# Patient Record
Sex: MALE | Race: WHITE | NOT HISPANIC OR LATINO | Employment: FULL TIME | URBAN - METROPOLITAN AREA
[De-identification: names, ages, dates, MRNs, and addresses within clinical notes are randomized per-mention and may not be internally consistent; named-entity substitution may affect disease eponyms.]

---

## 2018-04-18 ENCOUNTER — CONSULT (OUTPATIENT)
Dept: CARDIOLOGY | Facility: CLINIC | Age: 38
End: 2018-04-18

## 2018-04-18 VITALS
HEIGHT: 71 IN | BODY MASS INDEX: 26.32 KG/M2 | HEART RATE: 84 BPM | SYSTOLIC BLOOD PRESSURE: 132 MMHG | DIASTOLIC BLOOD PRESSURE: 94 MMHG | WEIGHT: 188 LBS

## 2018-04-18 DIAGNOSIS — E78.2 MIXED HYPERLIPIDEMIA: ICD-10-CM

## 2018-04-18 DIAGNOSIS — R03.0 TRANSIENT ELEVATED BLOOD PRESSURE: Primary | ICD-10-CM

## 2018-04-18 PROCEDURE — 93000 ELECTROCARDIOGRAM COMPLETE: CPT | Performed by: INTERNAL MEDICINE

## 2018-04-18 PROCEDURE — 99203 OFFICE O/P NEW LOW 30 MIN: CPT | Performed by: INTERNAL MEDICINE

## 2018-07-20 NOTE — PROGRESS NOTES
Subjective:     Encounter Date:07/23/2018    Patient ID: Julian Willoughby is a 37 y.o. 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     PHYSICIAN: Maribel Syed MD  ALLERGIST: Lala Jeong MD    Chief Complaint:   Chief Complaint   Patient presents with   • Hyperlipidemia   • Hypertension     Problem List:  1. Elevated labile blood pressure readings without definitive diagnosis of hypertension with residual class I symptoms and normal EKG  2. Hyperlipidemia; ASCVD 10-year risk is 1%, 0.6% if treated  3. Allergic rhinitis; gets routine allergy shots  4. Borderline overweight with recent 10-15 pound weight loss with diet (winter 2018)  5. Surgical history:  A. Right tear duct repair  B. [place] teeth extraction     Allergies   Allergen Reactions   • Claritin [Loratadine] Other (See Comments)     Depressed     • Sudafed [Pseudoephedrine Hcl] Other (See Comments)     depressed   • Sulfa Antibiotics GI Intolerance         Current Outpatient Prescriptions:   •  Multiple Vitamin (MULTI-VITAMIN DAILY PO), Take  by mouth As Needed., Disp: , Rfl:     HISTORY OF PRESENT ILLNESS:  Patient is here for a 4 month follow-up.  At his last appointment, he was encouraged to check his blood pressure at home. He denies any chest pain, palpitations, presyncope, syncope, edema, or shortness of breath.  He has tried to watch his diet, eat more fish and lower his carbs.  He has started running and is planning to [facility] the [employer].  He brought his blood pressure log with him today for us to review and average blood pressure is around 125-130 systolic.  Patient otherwise denies chest pain, shortness of breath, PND, edema, palpitations, syncope or presyncope at this time.    Review of Systems   All other systems reviewed and are negative.     Obtained and otherwise negative except as outlined in problem list and  "HPI.    Procedures       Objective:       Vitals:    07/23/18 0838 07/23/18 0840   BP: 121/91 116/87   BP Location: Left arm Left arm   Patient Position: Sitting Standing   Pulse: 77 78   Weight: 89.4 kg (197 lb) 89.4 kg (197 lb)   Height: 177.8 cm (70\") 177.8 cm (70\")     Body mass index is 28.27 kg/m².  Last weight April 2018 was 188 pounds    Physical Exam   Constitutional: He is oriented to person, place, and time. He appears well-developed and well-nourished.   Neck: No JVD present. Carotid bruit: right. No thyromegaly present.   Cardiovascular: Regular rhythm, S1 normal and S2 normal.  Exam reveals no gallop, no S3 and no friction rub.    Murmur heard.   Medium-pitched harsh early systolic murmur is present with a grade of 1/6  at the upper right sternal border radiating to the neck  Pulses:       Dorsalis pedis pulses are 2+ on the right side, and 2+ on the left side.        Posterior tibial pulses are 2+ on the right side, and 2+ on the left side.   Pulmonary/Chest: Effort normal and breath sounds normal. He has no wheezes. He has no rhonchi. He has no rales.   Abdominal: Soft. He exhibits no mass. There is no hepatosplenomegaly. There is no tenderness. There is no guarding.   Bowel sounds audible x4   Musculoskeletal: Normal range of motion. He exhibits no edema.   Lymphadenopathy:     He has no cervical adenopathy.   Neurological: He is alert and oriented to person, place, and time.   Skin: Skin is warm, dry and intact. No rash noted.   Vitals reviewed.        Lab Review:   9/13/17:  · CBC: WBC 5.4, RBC 5.95, hemoglobin 17.5, hematocrit 52.2, MCV 87.7, MCH 29.4, MCHC 33.5, RDW 12.8, platelets 254, MPV 10.6, neutrophils 61.3, lymphocytes 26.7, monocytes 9.8, eos 1.1, basos 0.9  · CMP: Sodium 142, potassium 4.2, chloride 106, CO2 27, glucose 82, BUN 17, creatinine 0.9, calcium 8.7, protein 7.6, albumin 4.1, bilirubin 0.9, alkaline phosphatase 80, AST 25, ALTs 50, globulin 3.5, GFR 95  · Lipid panel: " Cholesterol 176, triglycerides 100, HDL 44,   · Hemoglobin A1c - 5.1%        Assessment:     Overall continued acceptable course with no interim cardiopulmonary complaints with good functional status. We will defer additional diagnostic or therapeutic intervention from a cardiac perspective at this time. Encouraged the patient to monitor his blood pressure twice weekly and to continue the heart healthy diet as well as physical activity.      Diagnosis Plan   1. Transient elevated blood pressure  Stable   2. Mixed hyperlipidemia  No new labs          Plan:         1. Patient to continue current medications and close follow up with the above providers.  2. Tentative cardiology follow up in December 2018 or patient may return sooner PRN.    Scribed for Per Foster MD by Nilsa Martin, MICHAEL 7/23/2018  8:57 AM    I, Per Foster MD, FACC, personally performed the services described in this documentation as scribed by the above named individual in my presence, and it is both accurate and complete. At 9:22 AM on 07/23/2018

## 2018-07-23 ENCOUNTER — OFFICE VISIT (OUTPATIENT)
Dept: CARDIOLOGY | Facility: CLINIC | Age: 38
End: 2018-07-23

## 2018-07-23 VITALS
BODY MASS INDEX: 28.2 KG/M2 | HEIGHT: 70 IN | SYSTOLIC BLOOD PRESSURE: 116 MMHG | WEIGHT: 197 LBS | DIASTOLIC BLOOD PRESSURE: 87 MMHG | HEART RATE: 78 BPM

## 2018-07-23 DIAGNOSIS — R03.0 TRANSIENT ELEVATED BLOOD PRESSURE: Primary | ICD-10-CM

## 2018-07-23 DIAGNOSIS — E78.2 MIXED HYPERLIPIDEMIA: ICD-10-CM

## 2018-07-23 PROCEDURE — 99213 OFFICE O/P EST LOW 20 MIN: CPT | Performed by: INTERNAL MEDICINE

## 2018-12-28 ENCOUNTER — OFFICE VISIT (OUTPATIENT)
Dept: CARDIOLOGY | Facility: CLINIC | Age: 38
End: 2018-12-28

## 2018-12-28 VITALS
HEART RATE: 100 BPM | DIASTOLIC BLOOD PRESSURE: 84 MMHG | BODY MASS INDEX: 27.58 KG/M2 | WEIGHT: 197 LBS | SYSTOLIC BLOOD PRESSURE: 120 MMHG | HEIGHT: 71 IN

## 2018-12-28 DIAGNOSIS — R09.89 LABILE HYPERTENSION: ICD-10-CM

## 2018-12-28 DIAGNOSIS — E78.5 DYSLIPIDEMIA: Primary | ICD-10-CM

## 2018-12-28 PROCEDURE — 99213 OFFICE O/P EST LOW 20 MIN: CPT | Performed by: INTERNAL MEDICINE

## 2018-12-28 NOTE — PROGRESS NOTES
Subjective:     Encounter Date:12/28/2018    Patient ID: Julian Willoughby is a 38 y.o. 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     PHYSICIAN: Maribel Syed MD  ALLERGIST: Lala Jeong MD    Chief Complaint:   Chief Complaint   Patient presents with   • transient elevated blood pressure     Problem List:  1. Elevated labile blood pressure readings without definitive diagnosis of hypertension with residual class I symptoms and normal EKG  2. Hyperlipidemia; ASCVD 10-year risk is 1%, 0.6% if treated  3. Allergic rhinitis; gets routine allergy shots  4. Borderline overweight with recent 10-15 pound weight loss with diet (winter 2018)  5. Surgical history:  a. Right tear duct repair  b. [place] teeth extraction    Allergies   Allergen Reactions   • Claritin [Loratadine] Other (See Comments)     Depressed     • Sudafed [Pseudoephedrine Hcl] Other (See Comments)     depressed   • Sulfa Antibiotics GI Intolerance         Current Outpatient Medications:   •  Multiple Vitamin (MULTI-VITAMIN DAILY PO), Take  by mouth As Needed., Disp: , Rfl:     HISTORY OF PRESENT ILLNESS: Patient returns for scheduled 5-month followup.  He says that his job has changed some now at ; he is still in research, but his responsibilities are new, negotiating contracts for the [facility].  He has not been very active lately; he states that his weight had gotten down to about 183 pounds before he ran his first 5K in August 2018, but due to inactivity, his weight is now back up to 197 pounds (exactly where he was 5 months ago).  He had labs drawn at work on 12/19/2018, and these results are reviewed with him (see below).  He is advised that LDL above 130 would be of concern, but his is acceptable on these labs.  He is concerned about his slightly elevated  "blood pressure, and he is advised that weight loss would help in this regard.  He has plans to meet up with a group at [employer] to run with them.  He has had his flu shot and a hepatitis A vaccine this year. Patient otherwise denies chest pain, shortness of breath, PND, edema, palpitations, syncope or presyncope at this time.      ROS   Obtained and otherwise negative except as outlined in problem list and HPI.    Procedures       Objective:       Vitals:    12/28/18 1026 12/28/18 1027 12/28/18 1047   BP: 138/98 131/89 120/84   BP Location: Left arm Left arm Left arm   Patient Position: Sitting Standing Sitting   Cuff Size:   Adult   Pulse: 95 100    Weight: 89.4 kg (197 lb) 89.4 kg (197 lb)    Height: 180.3 cm (71\") 180.3 cm (71\")      Body mass index is 27.48 kg/m².   Last weight:  197 lbs.    Physical Exam   Constitutional: He is oriented to person, place, and time. He appears well-developed and well-nourished.   Neck: No JVD present. Carotid bruit is not present. No thyromegaly present.   Cardiovascular: Regular rhythm, S1 normal, S2 normal and normal heart sounds. Exam reveals no gallop, no S3 and no friction rub.   No murmur heard.  Pulses:       Dorsalis pedis pulses are 2+ on the right side, and 2+ on the left side.        Posterior tibial pulses are 2+ on the right side, and 2+ on the left side.   Pulmonary/Chest: Effort normal and breath sounds normal. He has no wheezes. He has no rhonchi. He has no rales.   Abdominal: Soft. He exhibits no mass. There is no hepatosplenomegaly. There is no tenderness. There is no guarding.   Bowel sounds audible x4   Musculoskeletal: Normal range of motion. He exhibits no edema.   Lymphadenopathy:     He has no cervical adenopathy.   Neurological: He is alert and oriented to person, place, and time.   Skin: Skin is warm, dry and intact. No rash noted.   Vitals reviewed.        Lab Review:  12/19/2018: (reviewed with patient in office today)  · FLP - total " cholesterol 173, triglycerides 102, HDL-C 50, LDL-C 102, non-HDL-C 123  · Glucose - 83            Assessment:   Overall continued acceptable course with no interim cardiopulmonary complaints with good functional status. We will defer additional diagnostic or therapeutic intervention from a cardiac perspective at this time.       Diagnosis Plan   1. Dyslipidemia  Continue current activity and diet; no current requirement for statin drug therapy   2. Labile hypertension  Continue activity schedule and monitor blood pressure          Plan:         1. Patient to continue current medications and close follow up with the above providers.  2. Tentative cardiology follow up in July 2019, or patient may return sooner PRN.    Transcribed by Felicia Caraballo for Dr. Per Foster at 10:40 AM on 12/28/2018    I, Per Foster MD, [facility], personally performed the services described in this documentation as scribed by the above named individual in my presence, and it is both accurate and complete. At 11:15 AM on 12/28/2018

## 2020-10-16 ENCOUNTER — OFFICE VISIT (OUTPATIENT)
Dept: CARDIOLOGY | Facility: CLINIC | Age: 40
End: 2020-10-16

## 2020-10-16 VITALS
TEMPERATURE: 96 F | DIASTOLIC BLOOD PRESSURE: 90 MMHG | WEIGHT: 193 LBS | HEART RATE: 101 BPM | HEIGHT: 71 IN | BODY MASS INDEX: 27.02 KG/M2 | SYSTOLIC BLOOD PRESSURE: 140 MMHG

## 2020-10-16 DIAGNOSIS — E78.5 DYSLIPIDEMIA: Primary | ICD-10-CM

## 2020-10-16 DIAGNOSIS — R09.89 LABILE HYPERTENSION: ICD-10-CM

## 2020-10-16 DIAGNOSIS — R03.0 TRANSIENT ELEVATED BLOOD PRESSURE: ICD-10-CM

## 2020-10-16 PROCEDURE — 99214 OFFICE O/P EST MOD 30 MIN: CPT | Performed by: INTERNAL MEDICINE

## 2020-10-16 NOTE — PROGRESS NOTES
Subjective:     Encounter Date:10/16/2020    Patient ID: Julian Willoughby is a 40 y.o. 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     REMOTE PHYSICIAN: Maribel Syed MD  ALLERGIST: Lala Jeong MD    Chief Complaint:   Chief Complaint   Patient presents with   • Follow-up     Labile Hypertension       Problem List:  1. Elevated labile blood pressure readings without definitive diagnosis of hypertension with residual class I symptoms and normal EKG  2. Hyperlipidemia; ASCVD 10-year risk is 1%, 0.6% if treated  3. Allergic rhinitis; gets routine allergy shots  4. Borderline overweight with recent 10-15 pound weight loss with diet (winter 2018)  5. Surgical history:  a. Right tear duct repair  b. [place] teeth extraction    Allergies   Allergen Reactions   • Claritin [Loratadine] Other (See Comments)     Depressed     • Sudafed [Pseudoephedrine Hcl] Other (See Comments)     depressed   • Sulfa Antibiotics GI Intolerance       Current Outpatient Medications:   •  Multiple Vitamin (MULTI-VITAMIN DAILY PO), Take  by mouth As Needed., Disp: , Rfl:     History of Present Illness: Patient returns for a follow up after a 22 month hiatus. Patient has been doing well overall. He reports taking blood pressure readings at home with readings that fluctuated between 130-150 torr systolic. In the last week he has had a reading up to 157 systolic. He says that his diet has changed recently to a more processed food diet due to issues getting the food that he wants during the pandemic. He is staying active by participating in cross-fit training programs and mentions a history of running. He has eliminated red meat from his diet. He has struggles sleeping at times but has tried using ASMR techniques to help him sleep. He is  "still working for [employer].  Patient otherwise denies chest pain, shortness of breath, PND, edema, palpitations, syncope or presyncope at this time. Patient has not had any ER visits, hospitalizations, surgeries, or new diagnoses since he was last seen. He has not had any laboratory studies since he was last seen in the office. He has had influenza immunization this fall.       ROS   Obtained and negative except as outlined in problem list and HPI.    Procedures       Objective:       Vitals:    10/16/20 1532 10/16/20 1534   BP: 134/100 140/90   BP Location: Right arm Right arm   Patient Position: Sitting Standing   Pulse: 97 101   Temp: 96 °F (35.6 °C)    Weight: 87.5 kg (193 lb)    Height: 180.3 cm (71\")      Body mass index is 26.92 kg/m².  Last weight: 197 lbs.    Vitals signs reviewed.   Constitutional:       Appearance: Well-developed.   Neck:      Thyroid: No thyromegaly.      Vascular: No carotid bruit or JVD.      Lymphadenopathy: No cervical adenopathy.   Pulmonary:      Effort: Pulmonary effort is normal.      Breath sounds: Normal breath sounds. No wheezing. No rhonchi. No rales.   Cardiovascular:      Regular rhythm.      No gallop. No S3 gallop.      Comments: No radial-femoral pulse delay  No flank bruits or pulsations noted  Pulses:     Dorsalis pedis: 2+ bilaterally.     Posterior tibial: 2+ bilaterally.  Edema:     Peripheral edema absent.   Abdominal:      Palpations: Abdomen is soft. There is no abdominal mass.      Tenderness: There is no abdominal tenderness. There is no guarding.   Musculoskeletal: Normal range of motion.   Skin:     General: Skin is warm and dry.      Findings: No rash.   Neurological:      Mental Status: Alert and oriented to person, place, and time.           Lab Review: No recent lab results available for review.              Assessment:       Overall continued acceptable course with no new interim cardiopulmonary complaints with good functional status. We will " defer additional diagnostic or therapeutic intervention from a cardiac perspective at this time other than to screen for persistent hypertension and sleep apnea.     Diagnosis Plan   1. Dyslipidemia  Continue current activity and diet; will reassess FLP   2. Labile hypertension  Continue activity and monitor blood pressure; see below          Plan:         1. Patient to continue current medications and close follow up with the above providers.  2. 24 hour ambulatory blood pressure monitor.  3. Outpatient nocturnal oximetry screening study  4. The following laboratory studies are requested:   A. CMP  B. FLP  C. CBC  D. TSH  5. 1 800 card  6. Tentative cardiology follow up in 6-8 weeks or patient may return sooner PRN.  7. Consider urinalysis if diminished GFR.      Scribed for Per Foster MD by Benjamin Mcfadden 10/16/2020  15:58 EDT    I, Per Foster MD, [facility], personally performed the services described in this documentation as scribed by the above named individual in my presence, and it is both accurate and complete. At 16:15 EDT on 10/16/2020

## 2020-11-03 ENCOUNTER — HOSPITAL ENCOUNTER (OUTPATIENT)
Dept: CARDIOLOGY | Facility: HOSPITAL | Age: 40
Discharge: HOME | End: 2020-11-03

## 2020-11-03 ENCOUNTER — CLINICAL SUPPORT (OUTPATIENT)
Dept: CARDIOLOGY | Facility: HOSPITAL | Age: 40
End: 2020-11-03

## 2020-11-03 DIAGNOSIS — R03.0 TRANSIENT ELEVATED BLOOD PRESSURE: ICD-10-CM

## 2020-11-03 PROCEDURE — 93786 AMBL BP MNTR W/SW REC ONLY: CPT

## 2020-11-03 NOTE — PROGRESS NOTES
Julian Willoughby  : 1980  MRN: [id number]  Today's Date: 20    Bedtime __________    I woke up at _______      [facility]  [address] [place] [address], [address] [address]  [phone]    During the day, the monitor will pump air and the cuff will inflate approximately every twenty minutes.  In the evening, the pump-up interval changes to every thirty minutes.  In the late evening (9:00 p.m.--10:00 p.m.), the cuff will inflate every hour until 8:00 a.m. the following morning when the twenty-minute interval begins again.    When you feel the cuff inflating, stop what you are doing, straighten your arm, and be still.  If while the cuff is inflated, your arm is bent or there is too much movement, the cuff will re-inflate and attempt another reading.  When the cuff deflates, resume your normal activity.    The monitor is self-contained and records and displays all readings.  Every time the cuff deflates, your blood pressure and heart rate will be displayed twice.    If you bathe or change clothing, remove the monitor.  It is difficult to re-wrap or re-apply the cuff; before removing it, make sure someone is available to  help you.  Whether the cuff is on your left or right arm, the gray tube must be directly above the bend of your elbow.    If the cuff inflates when it is not wrapped around your arm, let it deflate and disconnect it from the black tube, push out any remaining air, reconnect the tubing, and wrap it around your arm again.  The monitor will resume readings at the next proper time.    After wearing the cuff for twenty-four hours, turn the monitor off by holding the button for several seconds, or by removing the batteries.            BPMONITORINSTRUCTIONS 8                  Ambulatory Blood Pressure Monitor Patient Agreement    I, Julian Willoughby, 1980, [id number] assume full responsibility for the safekeeping and care of the monitor while it  is in my possession.      I have been advised that it is not waterproof and that any water that comes in contact with the monitor may cause damage that could require the unit to be replaced.    Until I return the monitor and its attachments to [facility], I will assume responsibility for any damage to the monitor due to neglect or misuse of same.    I understand that I am responsible for returning the monitor or I will be responsible for all costs for replacing the equipment.  Failure to return the monitor will result in a replacement charge of $1800.00 which will be billed to me five business days following the date of hookup.    Unless instructed otherwise, I will wear the monitor for 24 hours.    I was given the opportunity to ask questions and left the office with the device instructions.    I will return the monitor no later than 11/04/2020 to:    [facility], [address] [place] [address], [address], [place] [address]    Date of Hookup: 11/03/20    Ordered by: Dr. Foster    Hooked up by: Stefania Alston MA Cuff placed on left arm.    Serial Number: [id number]    Termination Date: 11/04/2020  Time: 8:40am    Patient or Guardian Signature: ______________________, 11/03/20    Date Monitor Returned: __________________            BPMONITORINSTRUCTIONS 8.12.2019

## 2020-11-06 ENCOUNTER — TELEPHONE (OUTPATIENT)
Dept: CARDIOLOGY | Facility: CLINIC | Age: 40
End: 2020-11-06

## 2020-11-06 DIAGNOSIS — R09.89 LABILE HYPERTENSION: Primary | ICD-10-CM

## 2020-11-06 NOTE — TELEPHONE ENCOUNTER
Called pt regarded elevated BP readings on 24-hour blood pressure monitor.     Per [facility]- plasma catecholamines, PRA, amlodipine-valsartan 5-160 mg daily.    Pt states that he has concerns about starting medications without knowing cause of HTN and would prefer not to start medications at this time and is going to have labs done first.     Advised pt that labs orders are in and that he can go to [facility] lab to have labs drawn and to continue to monitor BP at home. Pt verbalizes understanding and agreeable to plan.      [facility] notified.

## 2020-11-07 ENCOUNTER — LAB (OUTPATIENT)
Dept: LAB | Facility: HOSPITAL | Age: 40
End: 2020-11-07

## 2020-11-07 DIAGNOSIS — R09.89 LABILE HYPERTENSION: ICD-10-CM

## 2020-11-07 PROCEDURE — 82384 ASSAY THREE CATECHOLAMINES: CPT

## 2020-11-07 PROCEDURE — 36415 COLL VENOUS BLD VENIPUNCTURE: CPT

## 2020-11-07 PROCEDURE — 84244 ASSAY OF RENIN: CPT

## 2020-11-07 PROCEDURE — 82088 ASSAY OF ALDOSTERONE: CPT

## 2020-11-13 LAB
DOPAMINE SERPL-MCNC: <30 PG/ML (ref 0–48)
EPINEPH PLAS-MCNC: 100 PG/ML (ref 0–62)
NOREPINEPH PLAS-MCNC: 465 PG/ML (ref 0–874)

## 2020-11-16 ENCOUNTER — TELEPHONE (OUTPATIENT)
Dept: CARDIOLOGY | Facility: CLINIC | Age: 40
End: 2020-11-16

## 2020-11-25 ENCOUNTER — OFFICE VISIT (OUTPATIENT)
Dept: CARDIOLOGY | Facility: CLINIC | Age: 40
End: 2020-11-25

## 2020-11-25 VITALS
HEIGHT: 71 IN | SYSTOLIC BLOOD PRESSURE: 130 MMHG | WEIGHT: 195 LBS | BODY MASS INDEX: 27.3 KG/M2 | DIASTOLIC BLOOD PRESSURE: 93 MMHG | HEART RATE: 81 BPM

## 2020-11-25 DIAGNOSIS — R09.89 LABILE HYPERTENSION: Primary | ICD-10-CM

## 2020-11-25 DIAGNOSIS — E78.5 DYSLIPIDEMIA: ICD-10-CM

## 2020-11-25 PROCEDURE — 99213 OFFICE O/P EST LOW 20 MIN: CPT | Performed by: INTERNAL MEDICINE

## 2020-11-25 NOTE — PROGRESS NOTES
Subjective:     Encounter Date:11/25/2020    Patient ID: Julian Willoughby is a 40 y.o. 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     REMOTE PHYSICIAN: Maribel Syed MD  ALLERGIST: Lala Jeong MD    Chief Complaint:   Chief Complaint   Patient presents with   • Dyslipidemia     f/u       Problem List:  1. Elevated labile blood pressure readings without definitive diagnosis of hypertension with residual class I symptoms and normal EKG, October 2020 with abnormal 24-hour ambulatory blood pressure monitor and acceptable serum catecholamine results as well as aldosterone/ PRA ratio result, autumn 2020  2. Hyperlipidemia; ASCVD 10-year risk is 1%, 0.6% if treated  3. Allergic rhinitis; gets routine allergy shots  4. Borderline overweight with recent 10-15 pound weight loss with diet (winter 2018)  5. Surgical history:  a. Right tear duct repair  b. [place] teeth extraction    Allergies   Allergen Reactions   • Claritin [Loratadine] Other (See Comments)     Depressed     • Sudafed [Pseudoephedrine Hcl] Other (See Comments)     depressed   • Sulfa Antibiotics GI Intolerance       Current Outpatient Medications:   •  Multiple Vitamin (MULTI-VITAMIN DAILY PO), Take  by mouth As Needed., Disp: , Rfl:     History of Present Illness: Patient returns for scheduled 6-week follow up. Patient had an abnormal 24-hour blood pressure monitor obtained and was recommended to initiate Exforge 5/160 daily. He also had laboratory studies obtained (see below).  The patient did not begin Exforge due to concerns of side effects and hesitancy to take medication.  He reports he has altered his diet and started to avoid eating significantly processed foods.  He continues to exercise regularly with [employer].  He has not been checking  "his blood pressure regularly nor has he seen any other providers in the interim.  He denies any current cardiac complaints. Patient otherwise denies chest pain, shortness of breath, PND, edema, palpitations, syncope or presyncope at this time.       ROS   Obtained and negative except as outlined in problem list and HPI.    Procedures       Objective:       Vitals:    11/25/20 0922 11/25/20 0925   BP: 136/91 130/93   BP Location: Right arm Right arm   Patient Position: Sitting Standing   Pulse: 77 81   Weight: 88.5 kg (195 lb)    Height: 180.3 cm (71\")      Body mass index is 27.2 kg/m².  Last weight: 197 lbs    Vitals signs reviewed.   Constitutional:       Appearance: Well-developed.   Neck:      Thyroid: No thyromegaly.      Vascular: No carotid bruit or JVD.      Lymphadenopathy: No cervical adenopathy.   Pulmonary:      Effort: Pulmonary effort is normal.      Breath sounds: Normal breath sounds. No wheezing. No rhonchi. No rales.   Cardiovascular:      Regular rhythm.      No gallop. No S3 gallop.   Pulses:     Radial: 2+ bilaterally.     Posterior tibial: 2+ bilaterally.  Edema:     Peripheral edema absent.   Abdominal:      Palpations: Abdomen is soft. There is no abdominal mass.      Tenderness: There is no abdominal tenderness. There is no guarding.   Musculoskeletal: Normal range of motion.   Skin:     General: Skin is warm and dry.      Findings: No rash.   Neurological:      Mental Status: Alert and oriented to person, place, and time.           Lab Review:     Reviewed with patient by letter:  Lab Results   Component Value Date    ALDOSTE 9.2 11/07/2020     · PRA- within normal limits  · Norepinephrine normal   · Dopamine- normal  · Epinephrine- 100    24-hour blood pressure monitor, 11/05/2020 (reviewed with patient by letter):  Average blood pressure of 140/96. Systolic blood pressure was greater than 140 76% of the time with a maximum value of 183. Awake blood pressure was 149/99 and sleep blood " pressure was 134/83. Diastolic was 90 torr or greater 76% of the time. HR on average was 85 BPM.   RECOMMENDED: plasma catecholamine level as well as serum aldosterone/PRA ratio and initiation of Exforge 5/160 daily.       Assessment:       Overall continued acceptable course with no new interim cardiopulmonary complaints with good functional status. We will defer additional diagnostic or therapeutic intervention from a cardiac perspective at this time other than for him to obtain a urinalysis and recommended laboratory studies from his last visit and to monitor his blood pressure.      Diagnosis Plan   1. Labile hypertension  Acceptable today in office; Continue dietary and lifestyle modifications.  Patient to keep blood pressure log and notify us if remaining elevated. Urinalysis, CMP, CBC, lipid panel, and TSH to be completed.   2. Dyslipidemia  No new FLP available for review; continue heart healthy diet and activity.  Patient agreeable to obtaining labs as discussed at prior visit.          Plan:         1. Patient to continue current medications and close follow up with the above providers.  2. The following laboratory studies are ordered:  A. Urinalysis  B. CMP  C. FLP  D. CBC  E. TSH  3. Tentative cardiology follow up in January 2021 or patient may return sooner PRN.    Scribed for Per Foster MD by MICHAEL Pina 11/25/2020  10:45 EST    I, Per Foster MD, [facility], personally performed the services described in this documentation as scribed by the above named individual in my presence, and it is both accurate and complete. At 10:47 EST on 11/25/2020

## 2020-11-27 ENCOUNTER — LAB (OUTPATIENT)
Dept: LAB | Facility: HOSPITAL | Age: 40
End: 2020-11-27

## 2020-11-27 DIAGNOSIS — R09.89 LABILE HYPERTENSION: ICD-10-CM

## 2020-11-27 DIAGNOSIS — E78.5 DYSLIPIDEMIA: ICD-10-CM

## 2020-11-27 LAB
ALBUMIN SERPL-MCNC: 4.8 G/DL (ref 3.5–5.2)
ALBUMIN/GLOB SERPL: 1.5 G/DL
ALP SERPL-CCNC: 77 U/L (ref 39–117)
ALT SERPL W P-5'-P-CCNC: 49 U/L (ref 1–41)
ANION GAP SERPL CALCULATED.3IONS-SCNC: 10.1 MMOL/L (ref 5–15)
AST SERPL-CCNC: 42 U/L (ref 1–40)
BILIRUB SERPL-MCNC: 0.9 MG/DL (ref 0–1.2)
BILIRUB UR QL STRIP: NEGATIVE
BUN SERPL-MCNC: 16 MG/DL (ref 6–20)
BUN/CREAT SERPL: 17.4 (ref 7–25)
CALCIUM SPEC-SCNC: 9.7 MG/DL (ref 8.6–10.5)
CHLORIDE SERPL-SCNC: 102 MMOL/L (ref 98–107)
CHOLEST SERPL-MCNC: 183 MG/DL (ref 0–200)
CLARITY UR: CLEAR
CO2 SERPL-SCNC: 27.9 MMOL/L (ref 22–29)
COLOR UR: ABNORMAL
CREAT SERPL-MCNC: 0.92 MG/DL (ref 0.76–1.27)
DEPRECATED RDW RBC AUTO: 38.4 FL (ref 37–54)
ERYTHROCYTE [DISTWIDTH] IN BLOOD BY AUTOMATED COUNT: 12.3 % (ref 12.3–15.4)
GFR SERPL CREATININE-BSD FRML MDRD: 91 ML/MIN/1.73
GLOBULIN UR ELPH-MCNC: 3.3 GM/DL
GLUCOSE SERPL-MCNC: 70 MG/DL (ref 65–99)
GLUCOSE UR STRIP-MCNC: NEGATIVE MG/DL
HCT VFR BLD AUTO: 54.9 % (ref 37.5–51)
HDLC SERPL-MCNC: 39 MG/DL (ref 40–60)
HGB BLD-MCNC: 18.5 G/DL (ref 13–17.7)
HGB UR QL STRIP.AUTO: NEGATIVE
KETONES UR QL STRIP: NEGATIVE
LDLC SERPL CALC-MCNC: 120 MG/DL (ref 0–100)
LDLC/HDLC SERPL: 3.01 {RATIO}
LEUKOCYTE ESTERASE UR QL STRIP.AUTO: NEGATIVE
MCH RBC QN AUTO: 29 PG (ref 26.6–33)
MCHC RBC AUTO-ENTMCNC: 33.7 G/DL (ref 31.5–35.7)
MCV RBC AUTO: 85.9 FL (ref 79–97)
NITRITE UR QL STRIP: NEGATIVE
PH UR STRIP.AUTO: <=5 [PH] (ref 5–8)
PLATELET # BLD AUTO: 294 10*3/MM3 (ref 140–450)
PMV BLD AUTO: 10.5 FL (ref 6–12)
POTASSIUM SERPL-SCNC: 5.2 MMOL/L (ref 3.5–5.2)
PROT SERPL-MCNC: 8.1 G/DL (ref 6–8.5)
PROT UR QL STRIP: NEGATIVE
RBC # BLD AUTO: 6.39 10*6/MM3 (ref 4.14–5.8)
SODIUM SERPL-SCNC: 140 MMOL/L (ref 136–145)
SP GR UR STRIP: 1.03 (ref 1–1.03)
TRIGL SERPL-MCNC: 133 MG/DL (ref 0–150)
TSH SERPL DL<=0.05 MIU/L-ACNC: 2.1 UIU/ML (ref 0.27–4.2)
UROBILINOGEN UR QL STRIP: ABNORMAL
VLDLC SERPL-MCNC: 24 MG/DL (ref 5–40)
WBC # BLD AUTO: 5.12 10*3/MM3 (ref 3.4–10.8)

## 2020-11-27 PROCEDURE — 84443 ASSAY THYROID STIM HORMONE: CPT

## 2020-11-27 PROCEDURE — 85027 COMPLETE CBC AUTOMATED: CPT

## 2020-11-27 PROCEDURE — 81003 URINALYSIS AUTO W/O SCOPE: CPT

## 2020-11-27 PROCEDURE — 36415 COLL VENOUS BLD VENIPUNCTURE: CPT

## 2020-11-27 PROCEDURE — 80053 COMPREHEN METABOLIC PANEL: CPT

## 2020-11-27 PROCEDURE — 80061 LIPID PANEL: CPT

## 2021-01-27 ENCOUNTER — OFFICE VISIT (OUTPATIENT)
Dept: CARDIOLOGY | Facility: CLINIC | Age: 41
End: 2021-01-27

## 2021-01-27 VITALS
WEIGHT: 198 LBS | SYSTOLIC BLOOD PRESSURE: 140 MMHG | HEART RATE: 89 BPM | DIASTOLIC BLOOD PRESSURE: 97 MMHG | BODY MASS INDEX: 27.72 KG/M2 | HEIGHT: 71 IN

## 2021-01-27 DIAGNOSIS — E78.5 DYSLIPIDEMIA: ICD-10-CM

## 2021-01-27 DIAGNOSIS — R09.89 LABILE HYPERTENSION: Primary | ICD-10-CM

## 2021-01-27 PROCEDURE — 99214 OFFICE O/P EST MOD 30 MIN: CPT | Performed by: INTERNAL MEDICINE

## 2021-01-27 RX ORDER — AMLODIPINE AND VALSARTAN 5; 160 MG/1; MG/1
1 TABLET ORAL DAILY
Qty: 90 TABLET | Refills: 3 | Status: SHIPPED | OUTPATIENT
Start: 2021-01-27 | End: 2021-02-19 | Stop reason: DRUGHIGH

## 2021-01-27 NOTE — PROGRESS NOTES
"    Subjective:     Encounter Date:01/27/2021    Patient ID: Julian Willoughby is a 40 y.o. 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 [employer].     REMOTE PHYSICIAN: Maribel Syed MD  ALLERGIST: Lala Jeong MD    Chief Complaint:   Chief Complaint   Patient presents with   • Labile hypertension       Problem List:  1. Elevated labile blood pressure readings without definitive diagnosis of hypertension with residual class I symptoms and normal EKG, October 2020 with abnormal 24-hour ambulatory blood pressure monitor and acceptable serum catecholamine results as well as aldosterone/ PRA ratio result with normal nocturnal oximetry screening study, autumn 2020  2. Hyperlipidemia; ASCVD 10-year risk is 1%, 0.6% if treated  3. Allergic rhinitis; gets routine allergy shots  4. Borderline overweight with recent 10-15 pound weight loss with diet (winter 2018)  5. Surgical history:  a. Right tear duct repair  b. [place] teeth extraction    Allergies   Allergen Reactions   • Claritin [Loratadine] Other (See Comments)     Depressed     • Sudafed [Pseudoephedrine Hcl] Other (See Comments)     depressed   • Sulfa Antibiotics GI Intolerance       Current Outpatient Medications:   •  Multiple Vitamin (MULTI-VITAMIN DAILY PO), Take  by mouth As Needed., Disp: , Rfl:     History of Present Illness: Patient returns for scheduled 6-week follow up. Since last visit, patient has been doing well overall. He admits he has not been checking his blood pressure frequently. He has questions regarding his recent laboratory studies from December 2020 and how is diet could be altering these values. He reports he has not been eating red meat since his mother was diagnosed with cancer but he does drink \"a lot\" of mild and eats eggs frequently. He " "endorses he is frustrated because he cannot figure out why he has hypertension other than that his job is very stressful and that he cannot control it on his own. He states he has a hard time falling asleep but sleeps roughly 6-8 hours a night. He states he previously was able to manage his hypertension when training for a 5K in 2018 but is currently 10 lbs heavier than he was then which he thinks could also be causing his elevated blood pressure readings. Patient has had no interim ER visits, hospitalizations, serious illnesses, or surgeries. Patient otherwise denies chest pain, shortness of breath, PND, edema, palpitations, syncope, or presyncope at this time. He has received the first round of the COVID-19 vaccination with [facility] and plans to receive the second dose next week.        ROS   Obtained and negative except as outlined in problem list and HPI.    Procedures       Objective:       Vitals:    01/27/21 1448 01/27/21 1450 01/27/21 1514   BP: 146/99 143/100 140/97   BP Location: Right arm Right arm Right arm   Patient Position: Sitting Standing Sitting   Pulse: 85 89    Weight: 89.8 kg (198 lb)     Height: 180.3 cm (71\")       Body mass index is 27.62 kg/m².  Last weight: 195 lbs    Vitals signs reviewed.   Constitutional:       Appearance: Well-developed.   Neck:      Thyroid: No thyromegaly.      Vascular: No carotid bruit or JVD.      Lymphadenopathy: No cervical adenopathy.   Pulmonary:      Effort: Pulmonary effort is normal.      Breath sounds: Normal breath sounds. No wheezing. No rhonchi. No rales.   Cardiovascular:      Regular rhythm.      No gallop. No S3 gallop.   Pulses:     Dorsalis pedis: 2+ bilaterally.     Posterior tibial: 2+ bilaterally.  Edema:     Peripheral edema absent.   Abdominal:      Palpations: Abdomen is soft. There is no abdominal mass.      Tenderness: There is no abdominal tenderness. There is no guarding.   Musculoskeletal: Normal range of motion.   Skin:     General: Skin is " warm and dry.      Findings: No rash.   Neurological:      Mental Status: Alert and oriented to person, place, and time.           Lab Review:     12/02/2020 (reviewed with patient by letter):  • CBC: hematocrit 55%, hemoglobin 18.5, WBC 5120 and normal indices, platelet count and differential  • CMP: normal electrolytes with excellent kidney function, serum creatinine 0.9, BUN 16, glucose 70 and normal serum calcium and liver function tests.   • Serum albumin- 4.8  • ALT and AST borderline elevated  • FLP: total cholesterol 183, triglycerides 132, HDL-C 39, LDL-C 120  • TSH- normal     Lab Results   Component Value Date    GLUCOSE 70 11/27/2020    BUN 16 11/27/2020    CREATININE 0.92 11/27/2020    EGFRIFNONA 91 11/27/2020    BCR 17.4 11/27/2020     11/27/2020    K 5.2 11/27/2020     11/27/2020    CO2 27.9 11/27/2020    CALCIUM 9.7 11/27/2020    ALBUMIN 4.80 11/27/2020    AST 42 (H) 11/27/2020    ALT 49 (H) 11/27/2020       Lab Results   Component Value Date    WBC 5.12 11/27/2020    HGB 18.5 (H) 11/27/2020    HCT 54.9 (H) 11/27/2020    MCV 85.9 11/27/2020     11/27/2020       Lab Results   Component Value Date    TSH 2.100 11/27/2020       Lab Results   Component Value Date    CHOL 183 11/27/2020     Lab Results   Component Value Date    TRIG 133 11/27/2020     Lab Results   Component Value Date    HDL 39 (L) 11/27/2020     Lab Results   Component Value Date     (H) 11/27/2020             Assessment:       Overall continued acceptable course with no new interim cardiopulmonary complaints with good functional status. We will defer additional diagnostic or therapeutic intervention from a cardiac perspective at this time other than to initiate anti-hypertensive therapy as outlined below.     Diagnosis Plan   1. Labile hypertension  Labile readings; see below.   2. Dyslipidemia  Suboptimal FLP, November 2020; continue heart healthy diet and activity          Plan:         1. Patient to  continue current medications and close follow up with the above providers with the initiation of Exforge 5/160 daily.  2. Tentative cardiology follow up in May 2021 or patient may return sooner PRN.    Scribed for Per Foster MD by Francine Art. 1/27/2021  15:22 EST     I, Per Foster MD, [facility], personally performed the services described in this documentation as scribed by the above named individual in my presence, and it is both accurate and complete. At 15:23 EST on 01/27/2021

## 2021-02-19 RX ORDER — AMLODIPINE AND VALSARTAN 5; 160 MG/1; MG/1
1 TABLET ORAL 2 TIMES DAILY
Qty: 60 TABLET | Refills: 11 | Status: SHIPPED | OUTPATIENT
Start: 2021-02-19 | End: 2021-03-12

## 2021-02-19 NOTE — TELEPHONE ENCOUNTER
Pt called and stated that he started amlodipine-valsartan 5-160 mg daily on Sunday 2/14/2021. Pt states that his BP has been running 150s-173/. Pt did not record HR.     Pt denies SOB, chest pain, palpitations, swelling, dizziness.     Pt currently taking:  Amlodipine-valsartan 5-160 mg daily    Please advise.

## 2021-02-19 NOTE — TELEPHONE ENCOUNTER
Noted; would increase amlodipine/valsartan 5/160 mg to twice daily for the immediate future and monitor his blood pressure 1-2 times daily and update us in 1 week concerning blood pressure readings.    Thanks!

## 2021-02-19 NOTE — TELEPHONE ENCOUNTER
Pt called back, gave KTS recommendations above. Pt verbalizes understanding and agreeable to plan.

## 2021-03-12 RX ORDER — AMLODIPINE AND VALSARTAN 5; 160 MG/1; MG/1
1 TABLET ORAL DAILY
Qty: 30 TABLET | Refills: 11
Start: 2021-03-12 | End: 2022-02-14 | Stop reason: SDUPTHER

## 2021-03-16 LAB
ALDOST SERPL-MCNC: 9.2 NG/DL (ref 0–30)
ALDOST/RENIN PLAS-RTO: 3.2 {RATIO} (ref 0–30)
RENIN PLAS-CCNC: 2.9 NG/ML/HR (ref 0.17–5.38)

## 2021-06-25 ENCOUNTER — OFFICE VISIT (OUTPATIENT)
Dept: CARDIOLOGY | Facility: CLINIC | Age: 41
End: 2021-06-25

## 2021-06-25 VITALS
SYSTOLIC BLOOD PRESSURE: 116 MMHG | OXYGEN SATURATION: 98 % | WEIGHT: 203.2 LBS | HEIGHT: 71 IN | BODY MASS INDEX: 28.45 KG/M2 | HEART RATE: 100 BPM | DIASTOLIC BLOOD PRESSURE: 80 MMHG

## 2021-06-25 DIAGNOSIS — R09.89 LABILE HYPERTENSION: Primary | ICD-10-CM

## 2021-06-25 DIAGNOSIS — E78.5 DYSLIPIDEMIA: ICD-10-CM

## 2021-06-25 PROCEDURE — 99213 OFFICE O/P EST LOW 20 MIN: CPT | Performed by: INTERNAL MEDICINE

## 2021-06-25 NOTE — PROGRESS NOTES
Subjective:     Encounter Date:06/25/2021    Patient ID: Julian Willoughby is a 40 y.o. 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     REMOTE PHYSICIAN: Maribel Syed MD  ALLERGIST: Lala Jeong MD    Chief Complaint:   Chief Complaint   Patient presents with   • Labile hypertension   • Dyslipidemia       Problem List:  1. Elevated labile blood pressure readings without definitive diagnosis of hypertension with residual class I symptoms and normal EKG, October 2020 with abnormal 24-hour ambulatory blood pressure monitor and acceptable serum catecholamine results as well as aldosterone/ PRA ratio result with normal nocturnal oximetry screening study, autumn 2020  2. Hyperlipidemia; ASCVD 10-year risk is 1%, 0.6% if treated  3. Allergic rhinitis; gets routine allergy shots  4. Borderline overweight with recent 10-15 pound weight loss with diet (winter 2018)  5. Surgical history:  a. Right tear duct repair  b. [place] teeth extraction    Allergies   Allergen Reactions   • Claritin [Loratadine] Other (See Comments)     Depressed     • Sudafed [Pseudoephedrine Hcl] Other (See Comments)     depressed   • Sulfa Antibiotics GI Intolerance       Current Outpatient Medications:   •  amLODIPine-valsartan (EXFORGE) 5-160 MG per tablet, Take 1 tablet by mouth Daily., Disp: 30 tablet, Rfl: 11  •  Multiple Vitamin (MULTI-VITAMIN DAILY PO), Take  by mouth As Needed., Disp: , Rfl:     History of Present Illness: Patient returns for scheduled 5-month follow up. He has been feeling well overall from a cardiovascular standpoint. Patient denies chest pain, shortness of breath, palpitations, edema, dizziness, and syncope. He is active and asymptomatic on a daily basis. He has had no interim ER visits, hospitalizations, serious  "illnesses, or surgeries. 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 He has received COVID immunization. He does not monitor his blood pressure regularly but it has been consistently within normal limits when it is checked. He reports he has been tolerating Exforge very well.      ROS   Obtained and negative except as outlined in problem list and HPI.    Procedures       Objective:       Vitals:    06/25/21 1322 06/25/21 1323   BP: 127/83 116/80   BP Location: Left arm Right arm   Patient Position: Sitting Standing   Pulse: 96 100   SpO2: 98%    Weight: 92.2 kg (203 lb 3.2 oz)    Height: 180.3 cm (71\")      Body mass index is 28.34 kg/m².  Last weight: 198 lbs    Vitals reviewed.   Constitutional:       Appearance: Well-developed.   Neck:      Thyroid: No thyromegaly.      Vascular: No carotid bruit or JVD.      Lymphadenopathy: No cervical adenopathy.   Pulmonary:      Effort: Pulmonary effort is normal.      Breath sounds: Normal breath sounds. No wheezing. No rhonchi. No rales.   Cardiovascular:      Regular rhythm.      No gallop. No S3 gallop.   Pulses:     Dorsalis pedis: 2+ bilaterally.     Posterior tibial: 2+ bilaterally.  Edema:     Peripheral edema absent.   Abdominal:      Palpations: Abdomen is soft. There is no abdominal mass.      Tenderness: There is no abdominal tenderness. There is no guarding.   Musculoskeletal: Normal range of motion. Skin:     General: Skin is warm and dry.      Findings: No rash.   Neurological:      Mental Status: Alert and oriented to person, place, and time.           Lab Review:   Lab Results   Component Value Date    GLUCOSE 70 11/27/2020    BUN 16 11/27/2020    CREATININE 0.92 11/27/2020    EGFRIFNONA 91 11/27/2020    BCR 17.4 11/27/2020     11/27/2020    K 5.2 11/27/2020     11/27/2020    CO2 27.9 11/27/2020    CALCIUM 9.7 11/27/2020    ALBUMIN 4.80 11/27/2020    AST 42 (H) " 11/27/2020    ALT 49 (H) 11/27/2020       Lab Results   Component Value Date    WBC 5.12 11/27/2020    HGB 18.5 (H) 11/27/2020    HCT 54.9 (H) 11/27/2020    MCV 85.9 11/27/2020     11/27/2020       Lab Results   Component Value Date    TSH 2.100 11/27/2020       Lab Results   Component Value Date    CHOL 183 11/27/2020     Lab Results   Component Value Date    TRIG 133 11/27/2020     Lab Results   Component Value Date    HDL 39 (L) 11/27/2020     Lab Results   Component Value Date     (H) 11/27/2020             Assessment:       Overall continued acceptable course with no new interim cardiopulmonary complaints with good functional status. We will defer additional diagnostic or therapeutic intervention from a cardiac perspective at this time.     Diagnosis Plan   1. Labile hypertension  Well controlled. Continue current treatment.   2. Dyslipidemia  Fair control. Continue heart healthy diet. Defer statin drug therapy at this time.          Plan:         1. Patient to continue current medications and close follow up with the above providers.  2. Tentative cardiology follow up in January 2022 or patient may return sooner PRN.    I, Artie Benson, attest that this documentation has been prepared under the direction and in the presence of Per Foster MD 06/25/2021    I, Per Foster MD, [facility], personally performed the services described in this documentation as scribed by the above named individual in my presence, and it is both accurate and complete. At 13:52 EDT on 06/25/2021

## 2022-01-26 ENCOUNTER — OFFICE VISIT (OUTPATIENT)
Dept: CARDIOLOGY | Facility: CLINIC | Age: 42
End: 2022-01-26

## 2022-01-26 VITALS
HEIGHT: 71 IN | HEART RATE: 111 BPM | WEIGHT: 218 LBS | BODY MASS INDEX: 30.52 KG/M2 | OXYGEN SATURATION: 97 % | SYSTOLIC BLOOD PRESSURE: 151 MMHG | DIASTOLIC BLOOD PRESSURE: 92 MMHG

## 2022-01-26 DIAGNOSIS — R09.89 LABILE HYPERTENSION: Primary | ICD-10-CM

## 2022-01-26 DIAGNOSIS — E66.9 MILD OBESITY: ICD-10-CM

## 2022-01-26 DIAGNOSIS — E78.5 DYSLIPIDEMIA: ICD-10-CM

## 2022-01-26 PROCEDURE — 99214 OFFICE O/P EST MOD 30 MIN: CPT | Performed by: INTERNAL MEDICINE

## 2022-01-26 RX ORDER — CETIRIZINE HYDROCHLORIDE 10 MG/1
10 TABLET ORAL DAILY
COMMUNITY

## 2022-01-26 NOTE — PROGRESS NOTES
Subjective:     Encounter Date:01/26/2022    Patient ID: Julian Willoughby is a 41 y.o. 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     REMOTE PHYSICIAN: Maribel Syed MD  ALLERGIST: Lala Jeong MD    Chief Complaint:   Chief Complaint   Patient presents with   • Hypertension   • Hyperlipidemia   • Dyslipidemia     Problem List:  1. Elevated labile blood pressure readings without definitive diagnosis of hypertension with residual class I symptoms and normal EKG, October 2020 with abnormal 24-hour ambulatory blood pressure monitor and acceptable serum catecholamine results as well as aldosterone/ PRA ratio result with normal nocturnal oximetry screening study, autumn 2020  2. Hyperlipidemia; ASCVD 10-year risk is 1%, 0.6% if treated  3. Allergic rhinitis; gets routine allergy shots  4. Mild obesity: BMI 30.42  5. Surgical history:  a. Right tear duct repair  b. [place] teeth extraction    Allergies   Allergen Reactions   • Claritin [Loratadine] Other (See Comments)     Depressed     • Sudafed [Pseudoephedrine Hcl] Other (See Comments)     depressed   • Sulfa Antibiotics GI Intolerance   • Tobramycin Unknown (See Comments)       Current Outpatient Medications:   •  amLODIPine-valsartan (EXFORGE) 5-160 MG per tablet, Take 1 tablet by mouth Daily., Disp: 30 tablet, Rfl: 11  •  cetirizine (zyrTEC) 10 MG tablet, Take 10 mg by mouth Daily., Disp: , Rfl:   •  Multiple Vitamin (MULTI-VITAMIN DAILY PO), Take  by mouth As Needed., Disp: , Rfl:     History of Present Illness: Patient returns for scheduled 7-month follow up.  In the interim the patient said that he had a cough and a lot of nasal congestion/phlegm.  He denied any fever or shortness of breath.  At first it was felt that he had a sinus infection and then  "it is questionable whether he may have had pneumonia versus bronchitis. He had a negative COVID-19 test.  His chest x-ray on 12/23/2021 reportedly showed opacities with no consolidation; data deficit.  He has had Augmentin and a steroid Dosepak.  He saw his allergist and is supposed to follow-up next month for a repeat chest x-ray.  He has not had any recent laboratory testing.  He walked from [facility] here for his appointment today. Patient has had no additional interim ER visits, hospitalizations, serious illnesses, or surgeries.      Review of Systems   All other systems reviewed and are negative.     Obtained and negative except as outlined in problem list and HPI.    Procedures       Objective:       Vitals:    01/26/22 1411 01/26/22 1417   BP: 142/86 151/92   BP Location: Right arm Right arm   Patient Position: Sitting Standing   Pulse: 107 111   SpO2: 97%    Weight: 98.9 kg (218 lb)    Height: 180.3 cm (70.98\")    Recheck blood pressure right arm sitting was 120/80, heart rate 96 bpm  Body mass index is 30.42 kg/m².  Last weight: 203 lbs    Vitals reviewed.   Constitutional:       Appearance: Well-developed.   Neck:      Thyroid: No thyromegaly.      Vascular: No carotid bruit or JVD.      Lymphadenopathy: No cervical adenopathy.   Pulmonary:      Effort: Pulmonary effort is normal.      Breath sounds: Normal breath sounds. No wheezing. No rhonchi. No rales.   Cardiovascular:      Regular rhythm.      No gallop. No S3 gallop.   Pulses:     Dorsalis pedis: 2+ bilaterally.     Posterior tibial: 2+ bilaterally.  Edema:     Peripheral edema absent.   Abdominal:      Palpations: Abdomen is soft. There is no abdominal mass.      Tenderness: There is no abdominal tenderness.   Musculoskeletal: Normal range of motion. Skin:     General: Skin is warm and dry.      Findings: No rash.   Neurological:      Mental Status: Alert and oriented to person, place, and time.           Lab Review:     No recent " laboratory studies available for review today.        Assessment:       Overall continued acceptable course with no new interim cardiopulmonary complaints with acceptable functional status. We will defer additional diagnostic or therapeutic intervention from a cardiac perspective at this time.  The patient has not had any recent laboratory testing so we will provide him with lab slip for CBC, CMP, and FLP when he is fasting.  We encouraged the patient to check his blood pressure 2-3 times per week.     Diagnosis Plan   1. Labile hypertension  Controlled, continue Exforge, take blood pressure 2-3 times per week at home   2. Dyslipidemia  FLP lab slip   3. Mild obesity  Encouraged the patient increase physical activity, adhere to heart healthy diet, limit salt          Plan:         1. Patient to continue current medications and close follow up with the above providers.  2. Tentative cardiology follow up in July 2022 or patient may return sooner PRN.  3. CBC, CMP, FLP lab slips provided to the patient in office today    Scribed for Per Foster MD by Nilsa Martin, APRN. 1/26/2022  14:46 EST    I, Per Foster MD, FACC, personally performed the services described in this documentation as scribed by the above named individual in my presence, and it is both accurate and complete. At 15:09 EST on 01/26/2022

## 2022-02-14 RX ORDER — AMLODIPINE AND VALSARTAN 5; 160 MG/1; MG/1
1 TABLET ORAL DAILY
Qty: 30 TABLET | Refills: 11 | Status: SHIPPED | OUTPATIENT
Start: 2022-02-14 | End: 2022-08-26 | Stop reason: SDUPTHER

## 2022-04-09 ENCOUNTER — LAB (OUTPATIENT)
Dept: LAB | Facility: HOSPITAL | Age: 42
End: 2022-04-09

## 2022-04-09 DIAGNOSIS — R09.89 LABILE HYPERTENSION: ICD-10-CM

## 2022-04-09 DIAGNOSIS — E78.5 DYSLIPIDEMIA: ICD-10-CM

## 2022-04-09 LAB
ALBUMIN SERPL-MCNC: 4.8 G/DL (ref 3.5–5.2)
ALBUMIN/GLOB SERPL: 1.7 G/DL
ALP SERPL-CCNC: 86 U/L (ref 39–117)
ALT SERPL W P-5'-P-CCNC: 35 U/L (ref 1–41)
ANION GAP SERPL CALCULATED.3IONS-SCNC: 9 MMOL/L (ref 5–15)
AST SERPL-CCNC: 23 U/L (ref 1–40)
BILIRUB SERPL-MCNC: 0.8 MG/DL (ref 0–1.2)
BUN SERPL-MCNC: 16 MG/DL (ref 6–20)
BUN/CREAT SERPL: 15.8 (ref 7–25)
CALCIUM SPEC-SCNC: 9.4 MG/DL (ref 8.6–10.5)
CHLORIDE SERPL-SCNC: 101 MMOL/L (ref 98–107)
CHOLEST SERPL-MCNC: 186 MG/DL (ref 0–200)
CO2 SERPL-SCNC: 29 MMOL/L (ref 22–29)
CREAT SERPL-MCNC: 1.01 MG/DL (ref 0.76–1.27)
DEPRECATED RDW RBC AUTO: 39.2 FL (ref 37–54)
EGFRCR SERPLBLD CKD-EPI 2021: 95.2 ML/MIN/1.73
ERYTHROCYTE [DISTWIDTH] IN BLOOD BY AUTOMATED COUNT: 12.5 % (ref 12.3–15.4)
GLOBULIN UR ELPH-MCNC: 2.9 GM/DL
GLUCOSE SERPL-MCNC: 94 MG/DL (ref 65–99)
HCT VFR BLD AUTO: 51.4 % (ref 37.5–51)
HDLC SERPL-MCNC: 41 MG/DL (ref 40–60)
HGB BLD-MCNC: 17.7 G/DL (ref 13–17.7)
LDLC SERPL CALC-MCNC: 118 MG/DL (ref 0–100)
LDLC/HDLC SERPL: 2.81 {RATIO}
MCH RBC QN AUTO: 29.8 PG (ref 26.6–33)
MCHC RBC AUTO-ENTMCNC: 34.4 G/DL (ref 31.5–35.7)
MCV RBC AUTO: 86.5 FL (ref 79–97)
PLATELET # BLD AUTO: 306 10*3/MM3 (ref 140–450)
PMV BLD AUTO: 10.6 FL (ref 6–12)
POTASSIUM SERPL-SCNC: 4.6 MMOL/L (ref 3.5–5.2)
PROT SERPL-MCNC: 7.7 G/DL (ref 6–8.5)
RBC # BLD AUTO: 5.94 10*6/MM3 (ref 4.14–5.8)
SODIUM SERPL-SCNC: 139 MMOL/L (ref 136–145)
TRIGL SERPL-MCNC: 149 MG/DL (ref 0–150)
VLDLC SERPL-MCNC: 27 MG/DL (ref 5–40)
WBC NRBC COR # BLD: 5.52 10*3/MM3 (ref 3.4–10.8)

## 2022-04-09 PROCEDURE — 80053 COMPREHEN METABOLIC PANEL: CPT

## 2022-04-09 PROCEDURE — 36415 COLL VENOUS BLD VENIPUNCTURE: CPT

## 2022-04-09 PROCEDURE — 80061 LIPID PANEL: CPT

## 2022-04-09 PROCEDURE — 85027 COMPLETE CBC AUTOMATED: CPT

## 2022-08-26 ENCOUNTER — OFFICE VISIT (OUTPATIENT)
Dept: CARDIOLOGY | Facility: CLINIC | Age: 42
End: 2022-08-26

## 2022-08-26 VITALS
DIASTOLIC BLOOD PRESSURE: 81 MMHG | HEIGHT: 71 IN | WEIGHT: 208.2 LBS | HEART RATE: 100 BPM | BODY MASS INDEX: 29.15 KG/M2 | SYSTOLIC BLOOD PRESSURE: 121 MMHG | OXYGEN SATURATION: 96 %

## 2022-08-26 DIAGNOSIS — E66.9 MILD OBESITY: ICD-10-CM

## 2022-08-26 DIAGNOSIS — E78.5 DYSLIPIDEMIA: ICD-10-CM

## 2022-08-26 DIAGNOSIS — R09.89 LABILE HYPERTENSION: Primary | ICD-10-CM

## 2022-08-26 PROCEDURE — 99214 OFFICE O/P EST MOD 30 MIN: CPT | Performed by: INTERNAL MEDICINE

## 2022-08-26 RX ORDER — CHLORAL HYDRATE 500 MG
1000 CAPSULE ORAL DAILY
COMMUNITY

## 2022-08-26 RX ORDER — AMLODIPINE AND VALSARTAN 5; 160 MG/1; MG/1
1 TABLET ORAL DAILY
Qty: 30 TABLET | Refills: 11 | Status: SHIPPED | OUTPATIENT
Start: 2022-08-26 | End: 2023-03-13 | Stop reason: SDUPTHER

## 2022-08-26 RX ORDER — HYDROCORTISONE ACETATE PRAMOXINE HCL 1; 1 G/100G; G/100G
CREAM TOPICAL AS NEEDED
COMMUNITY
Start: 2022-08-16

## 2022-08-26 RX ORDER — TRIAMCINOLONE ACETONIDE 1 MG/G
CREAM TOPICAL
COMMUNITY
Start: 2022-05-20 | End: 2022-11-10

## 2022-08-26 RX ORDER — CRANBERRY FRUIT EXTRACT 200 MG
1200 CAPSULE ORAL DAILY
COMMUNITY

## 2022-08-26 NOTE — PROGRESS NOTES
Subjective:     Encounter Date:08/26/2022    Patient ID: Julian Willoughby is a 41 y.o. 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.     REMOTE PHYSICIAN: Maribel Syed MD  SCHEDULED INTERNIST: Lucio Mesa MD  ALLERGIST: Lala Jeong MD    Chief Complaint:   Chief Complaint   Patient presents with   • Labile hypertension       Problem List:  1. Elevated labile blood pressure readings without definitive diagnosis of hypertension  a. Residual class I symptoms and normal EKG, October 2020   b. abnormal 24-hour ambulatory blood pressure monitor and acceptable serum catecholamine results as well as aldosterone/ PRA ratio result with normal nocturnal oximetry screening study, autumn 2020  c. Residual class I symptoms, August 2022  2. Hyperlipidemia; ASCVD 10-year risk is 1%, 0.6% if treated  3. Allergic rhinitis; gets routine allergy shots  4. Mild obesity: BMI 29.04  5. Surgical history:  a. Right tear duct repair  b. [place] teeth extraction    Allergies   Allergen Reactions   • Claritin [Loratadine] Other (See Comments)     Depressed     • Sudafed [Pseudoephedrine Hcl] Other (See Comments)     depressed   • Sulfa Antibiotics GI Intolerance   • Tobramycin Unknown (See Comments)       Current Outpatient Medications:   •  amLODIPine-valsartan (EXFORGE) 5-160 MG per tablet, Take 1 tablet by mouth Daily., Disp: 30 tablet, Rfl: 11  •  cetirizine (zyrTEC) 10 MG tablet, Take 10 mg by mouth Daily., Disp: , Rfl:   •  Multiple Vitamin (MULTI-VITAMIN DAILY PO), Take  by mouth As Needed., Disp: , Rfl:     History of Present Illness: Patient returns for scheduled 7-month follow up. Patient reports that when he last went to urgent treatment for hemorrhoids, and his blood pressure was 130/85 at that time-data deficit.  "He attributes his hemorrhoid development to being dehydrated. He has an appointment on 22 September 2022 to establish care with Dr. Mesa. 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 He plans on resuming his exercise plan. He requested that Dr. Cazares take over his cardiac care. Patient reports that he has started to take two over the counter supplements that were recommended to him by a friend to help with his lipids. He inquired about his elevated hematocrit, and reports that he does not eat much red meat. Patient denies chest pain, shortness of breath, orthopnea, palpitations, edema, dizziness, and syncope.  He has had no interim ER visits, hospitalizations, serious illnesses, or surgeries.       ROS   Obtained and negative except as outlined in problem list and HPI.    Procedures       Objective:       Vitals:    08/26/22 1438 08/26/22 1443   BP: 131/80 121/81   BP Location: Left arm Left arm   Patient Position: Sitting Standing   Pulse: 99 100   SpO2: 96%    Weight: 94.4 kg (208 lb 3.2 oz)    Height: 180.3 cm (71\")      Body mass index is 29.04 kg/m².  Last weight: 218 lbs    Vitals reviewed.   Constitutional:       Appearance: Well-developed.   Neck:      Thyroid: No thyromegaly.      Vascular: No carotid bruit or JVD.      Lymphadenopathy: No cervical adenopathy.   Pulmonary:      Effort: Pulmonary effort is normal.      Breath sounds: Normal breath sounds. No wheezing. No rhonchi. No rales.   Cardiovascular:      Regular rhythm.      No gallop. No S3 gallop.   Pulses:     Dorsalis pedis: 2+ bilaterally.     Posterior tibial: 2+ bilaterally.  Edema:     Peripheral edema absent.   Abdominal:      Palpations: Abdomen is soft. There is no abdominal mass.      Tenderness: There is no abdominal tenderness.   Musculoskeletal: Normal range of motion. Skin:     General: Skin is warm and dry.      Findings: No rash.   Neurological:      Mental Status: Alert and oriented to person, place, and time.           Lab " Review:     No recent laboratory studies available for review today. Labs dated 09 April 2022 reviewed with patient via letter, recommended further evaluation of elevated hemoglobin values, continuing current treatment)    Lab Results   Component Value Date    GLUCOSE 94 04/09/2022    BUN 16 04/09/2022    CREATININE 1.01 04/09/2022    BCR 15.8 04/09/2022     04/09/2022    K 4.6 04/09/2022     04/09/2022    CO2 29.0 04/09/2022    CALCIUM 9.4 04/09/2022    ALBUMIN 4.80 04/09/2022    AST 23 04/09/2022    ALT 35 04/09/2022       Lab Results   Component Value Date    WBC 5.52 04/09/2022    HGB 17.7 04/09/2022    HCT 51.4 (H) 04/09/2022    MCV 86.5 04/09/2022     04/09/2022       Lab Results   Component Value Date    CHOL 186 04/09/2022    CHOL 183 11/27/2020     Lab Results   Component Value Date    TRIG 149 04/09/2022    TRIG 133 11/27/2020     Lab Results   Component Value Date    HDL 41 04/09/2022    HDL 39 (L) 11/27/2020     Lab Results   Component Value Date     (H) 04/09/2022     (H) 11/27/2020     · XR chest, 28 February 2022  · FINDINGS: No focal airspace disease. Stable cardiomediastinal silhouette. No pleural effusion or pneumothorax. Thoracic spine degenerative changes  · Impression: no focal airspace consolidation        Assessment:       Overall continued acceptable course with no new interim cardiopulmonary complaints with acceptable functional status. We will defer additional diagnostic or therapeutic intervention from a cardiac perspective at this time, other than to have FLP and CMP laboratory studies drawn shortly before he returns in six months.     Diagnosis Plan   1. Labile hypertension  Excellent control. Continue current treatment.   2. Dyslipidemia  Suboptimal control. Continue current treatment, including heart healthy diet, exercise, and cardiac medications.   3. Mild obesity  BMI now 29.04; continue heart healthy diet, exercise, and cardiac medications.           Plan:         1. Patient to continue current medications and close follow up with the above providers.  2. Tentative cardiology follow up in February 2023 with Dr. Cazares or patient may return sooner PRN.  3. We will have a CMP and FLP drawn the week before he returns in six months.    Scribed for Per Foster MD by Caroline Nicholson. 8/26/2022  14:55 EDT       I, Per Foster MD, [facility], personally performed the services described in this documentation as scribed by the above named individual in my presence, and it is both accurate and complete. At 15:50 EDT on 08/26/2022

## 2022-09-22 ENCOUNTER — LAB (OUTPATIENT)
Dept: LAB | Facility: HOSPITAL | Age: 42
End: 2022-09-22

## 2022-09-22 ENCOUNTER — OFFICE VISIT (OUTPATIENT)
Dept: INTERNAL MEDICINE | Facility: CLINIC | Age: 42
End: 2022-09-22

## 2022-09-22 VITALS
RESPIRATION RATE: 20 BRPM | BODY MASS INDEX: 29.28 KG/M2 | TEMPERATURE: 97.7 F | HEART RATE: 72 BPM | WEIGHT: 209.13 LBS | SYSTOLIC BLOOD PRESSURE: 112 MMHG | DIASTOLIC BLOOD PRESSURE: 72 MMHG | HEIGHT: 71 IN

## 2022-09-22 DIAGNOSIS — R09.89 LABILE HYPERTENSION: ICD-10-CM

## 2022-09-22 DIAGNOSIS — E78.2 MIXED HYPERLIPIDEMIA: ICD-10-CM

## 2022-09-22 DIAGNOSIS — E78.5 DYSLIPIDEMIA: ICD-10-CM

## 2022-09-22 DIAGNOSIS — Z00.00 HEALTH CARE MAINTENANCE: ICD-10-CM

## 2022-09-22 DIAGNOSIS — H91.93 DECREASED HEARING OF BOTH EARS: Primary | ICD-10-CM

## 2022-09-22 LAB
ALBUMIN SERPL-MCNC: 4.7 G/DL (ref 3.5–5.2)
ALBUMIN/GLOB SERPL: 1.8 G/DL
ALP SERPL-CCNC: 71 U/L (ref 39–117)
ALT SERPL W P-5'-P-CCNC: 32 U/L (ref 1–41)
ANION GAP SERPL CALCULATED.3IONS-SCNC: 15 MMOL/L (ref 5–15)
AST SERPL-CCNC: 27 U/L (ref 1–40)
BILIRUB SERPL-MCNC: 0.5 MG/DL (ref 0–1.2)
BUN SERPL-MCNC: 14 MG/DL (ref 6–20)
BUN/CREAT SERPL: 14.6 (ref 7–25)
CALCIUM SPEC-SCNC: 9.4 MG/DL (ref 8.6–10.5)
CHLORIDE SERPL-SCNC: 102 MMOL/L (ref 98–107)
CHOLEST SERPL-MCNC: 173 MG/DL (ref 0–200)
CO2 SERPL-SCNC: 23 MMOL/L (ref 22–29)
CREAT SERPL-MCNC: 0.96 MG/DL (ref 0.76–1.27)
EGFRCR SERPLBLD CKD-EPI 2021: 100.6 ML/MIN/1.73
GLOBULIN UR ELPH-MCNC: 2.6 GM/DL
GLUCOSE SERPL-MCNC: 81 MG/DL (ref 65–99)
HDLC SERPL-MCNC: 44 MG/DL (ref 40–60)
LDLC SERPL CALC-MCNC: 105 MG/DL (ref 0–100)
LDLC/HDLC SERPL: 2.32 {RATIO}
POTASSIUM SERPL-SCNC: 4.1 MMOL/L (ref 3.5–5.2)
PROT SERPL-MCNC: 7.3 G/DL (ref 6–8.5)
SODIUM SERPL-SCNC: 140 MMOL/L (ref 136–145)
TRIGL SERPL-MCNC: 135 MG/DL (ref 0–150)
VLDLC SERPL-MCNC: 24 MG/DL (ref 5–40)

## 2022-09-22 PROCEDURE — 80061 LIPID PANEL: CPT | Performed by: INTERNAL MEDICINE

## 2022-09-22 PROCEDURE — 36415 COLL VENOUS BLD VENIPUNCTURE: CPT | Performed by: STUDENT IN AN ORGANIZED HEALTH CARE EDUCATION/TRAINING PROGRAM

## 2022-09-22 PROCEDURE — 86803 HEPATITIS C AB TEST: CPT | Performed by: STUDENT IN AN ORGANIZED HEALTH CARE EDUCATION/TRAINING PROGRAM

## 2022-09-22 PROCEDURE — 99386 PREV VISIT NEW AGE 40-64: CPT | Performed by: STUDENT IN AN ORGANIZED HEALTH CARE EDUCATION/TRAINING PROGRAM

## 2022-09-22 PROCEDURE — 80053 COMPREHEN METABOLIC PANEL: CPT | Performed by: INTERNAL MEDICINE

## 2022-09-22 NOTE — PATIENT INSTRUCTIONS
Health Maintenance, Male  A healthy lifestyle and preventive care is important for your health and wellness. Ask your health care provider about what schedule of regular examinations is right for you.  What should I know about weight and diet?    Eat a Healthy Diet  Eat plenty of vegetables, fruits, whole grains, low-fat dairy products, and lean protein.  Do not eat a lot of foods high in solid fats, added sugars, or salt.     Maintain a Healthy Weight  Regular exercise can help you achieve or maintain a healthy weight. You should:  Do at least 150 minutes of exercise each week. The exercise should increase your heart rate and make you sweat (moderate-intensity exercise).  Do strength-training exercises at least twice a week.     Watch Your Levels of Cholesterol and Blood Lipids  Have your blood tested for lipids and cholesterol every 5 years starting at 35 years of age. If you are at high risk for heart disease, you should start having your blood tested when you are 20 years old. You may need to have your cholesterol levels checked more often if:  Your lipid or cholesterol levels are high.  You are older than 50 years of age.  You are at high risk for heart disease.     What should I know about cancer screening?  Many types of cancers can be detected early and may often be prevented.  Lung Cancer  You should be screened every year for lung cancer if:  You are a current smoker who has smoked for at least 30 years.  You are a former smoker who has quit within the past 15 years.  Talk to your health care provider about your screening options, when you should start screening, and how often you should be screened.     Colorectal Cancer  Routine colorectal cancer screening usually begins at 50 years of age and should be repeated every 5-10 years until you are 75 years old. You may need to be screened more often if early forms of precancerous polyps or small growths are found. Your health care provider may recommend  screening at an earlier age if you have risk factors for colon cancer.  Your health care provider may recommend using home test kits to check for hidden blood in the stool.  A small camera at the end of a tube can be used to examine your colon (sigmoidoscopy or colonoscopy). This checks for the earliest forms of colorectal cancer.     Prostate and Testicular Cancer  Depending on your age and overall health, your health care provider may do certain tests to screen for prostate and testicular cancer.  Talk to your health care provider about any symptoms or concerns you have about testicular or prostate cancer.     Skin Cancer  Check your skin from head to toe regularly.  Tell your health care provider about any new moles or changes in moles, especially if:  There is a change in a mole’s size, shape, or color.  You have a mole that is larger than a pencil eraser.  Always use sunscreen. Apply sunscreen liberally and repeat throughout the day.  Protect yourself by wearing long sleeves, pants, a wide-brimmed hat, and sunglasses when outside.     What should I know about heart disease, diabetes, and high blood pressure?  If you are 18-39 years of age, have your blood pressure checked every 3-5 years. If you are 40 years of age or older, have your blood pressure checked every year. You should have your blood pressure measured twice--once when you are at a hospital or clinic, and once when you are not at a hospital or clinic. Record the average of the two measurements. To check your blood pressure when you are not at a hospital or clinic, you can use:  An automated blood pressure machine at a pharmacy.  A home blood pressure monitor.  Talk to your health care provider about your target blood pressure.  If you are between 45-79 years old, ask your health care provider if you should take aspirin to prevent heart disease.  Have regular diabetes screenings by checking your fasting blood sugar level.  If you are at a normal  weight and have a low risk for diabetes, have this test once every three years after the age of 45.  If you are overweight and have a high risk for diabetes, consider being tested at a younger age or more often.  A one-time screening for abdominal aortic aneurysm (AAA) by ultrasound is recommended for men aged 65-75 years who are current or former smokers.  What should I know about preventing infection?  Hepatitis B  If you have a higher risk for hepatitis B, you should be screened for this virus. Talk with your health care provider to find out if you are at risk for hepatitis B infection.  Hepatitis C  Blood testing is recommended for:  Everyone born from 1945 through 1965.  Anyone with known risk factors for hepatitis C.     Sexually Transmitted Diseases (STDs)  You should be screened each year for STDs including gonorrhea and chlamydia if:  You are sexually active and are younger than 24 years of age.  You are older than 24 years of age and your health care provider tells you that you are at risk for this type of infection.  Your sexual activity has changed since you were last screened and you are at an increased risk for chlamydia or gonorrhea. Ask your health care provider if you are at risk.  Talk with your health care provider about whether you are at high risk of being infected with HIV. Your health care provider may recommend a prescription medicine to help prevent HIV infection.     What else can I do?    Schedule regular health, dental, and eye exams.  Stay current with your vaccines (immunizations).  Do not use any tobacco products, such as cigarettes, chewing tobacco, and e-cigarettes. If you need help quitting, ask your health care provider.  Limit alcohol intake to no more than 2 drinks per day. One drink equals 12 ounces of beer, 5 ounces of wine, or 1½ ounces of hard liquor.  Do not use street drugs.  Do not share needles.  Ask your health care provider for help if you need support or information  about quitting drugs.  Tell your health care provider if you often feel depressed.  Tell your health care provider if you have ever been abused or do not feel safe at home.      This information is not intended to replace advice given to you by your health care provider. Make sure you discuss any questions you have with your health care provider.  Document Released: 06/15/2009 Document Revised: 08/16/2017 Document Reviewed: 09/20/2016  [employer] Interactive Patient Education © 2018 [employer]    Healthy Eating  Following a healthy eating pattern may help you to achieve and maintain a healthy body weight, reduce the risk of chronic disease, and live a long and productive life. It is important to follow a healthy eating pattern at an appropriate calorie level for your body. Your nutritional needs should be met primarily through food by choosing a variety of nutrient-rich foods.  What are tips for following this plan?  Reading food labels  Read labels and choose the following:  Reduced or low sodium.  Juices with 100% fruit juice.  Foods with low saturated fats and high polyunsaturated and monounsaturated fats.  Foods with whole grains, such as whole wheat, cracked wheat, brown rice, and wild rice.  Whole grains that are fortified with folic acid. This is recommended for women who are pregnant or who want to become pregnant.  Read labels and avoid the following:  Foods with a lot of added sugars. These include foods that contain brown sugar, corn sweetener, corn syrup, dextrose, fructose, glucose, high-fructose corn syrup, honey, invert sugar, lactose, malt syrup, maltose, molasses, raw sugar, sucrose, trehalose, or turbinado sugar.  Do not eat more than the following amounts of added sugar per day:  6 teaspoons (25 g) for women.  9 teaspoons (38 g) for men.  Foods that contain processed or refined starches and grains.  Refined grain products, such as white flour, degermed cornmeal, white bread, and white  "rice.  Shopping  Choose nutrient-rich snacks, such as vegetables, whole fruits, and nuts. Avoid high-calorie and high-sugar snacks, such as potato chips, fruit snacks, and candy.  Use oil-based dressings and spreads on foods instead of solid fats such as butter, stick margarine, or cream cheese.  Limit pre-made sauces, mixes, and \"instant\" products such as flavored rice, instant noodles, and ready-made pasta.  Try more plant-protein sources, such as tofu, tempeh, black beans, edamame, lentils, nuts, and seeds.  Explore eating plans such as the Mediterranean diet or vegetarian diet.  Cooking  Use oil to sauté or stir-abraham foods instead of solid fats such as butter, stick margarine, or lard.  Try baking, boiling, grilling, or broiling instead of frying.  Remove the fatty part of meats before cooking.  Steam vegetables in water or broth.  Meal planning    At meals, imagine dividing your plate into fourths:  One-half of your plate is fruits and vegetables.  One-fourth of your plate is whole grains.  One-fourth of your plate is protein, especially lean meats, poultry, eggs, tofu, beans, or nuts.  Include low-fat dairy as part of your daily diet.     Lifestyle  Choose healthy options in all settings, including home, work, school, restaurants, or stores.  Prepare your food safely:  Wash your hands after handling raw meats.  Keep food preparation surfaces clean by regularly washing with hot, soapy water.  Keep raw meats separate from ready-to-eat foods, such as fruits and vegetables.  , meat, poultry, and eggs to the recommended internal temperature.  Store foods at safe temperatures. In general:  Keep cold foods at 40°F (4.4°C) or below.  Keep hot foods at 140°F (60°C) or above.  Keep your freezer at 0°F (-17.8°C) or below.  Foods are no longer safe to eat when they have been between the temperatures of 40°-140°F (4.4-60°C) for more than 2 hours.  What foods should I eat?  Fruits  Aim to eat 2 cup-equivalents of " fresh, canned (in natural juice), or frozen fruits each day. Examples of 1 cup-equivalent of fruit include 1 small apple, 8 large strawberries, 1 cup canned fruit, ½ cup dried fruit, or 1 cup 100% juice.  Vegetables  Aim to eat 2½-3 cup-equivalents of fresh and frozen vegetables each day, including different varieties and colors. Examples of 1 cup-equivalent of vegetables include 2 medium carrots, 2 cups raw, leafy greens, 1 cup chopped vegetable (raw or cooked), or 1 medium baked potato.  Grains  Aim to eat 6 ounce-equivalents of whole grains each day. Examples of 1 ounce-equivalent of grains include 1 slice of bread, 1 cup ready-to-eat cereal, 3 cups popcorn, or ½ cup cooked rice, pasta, or cereal.  Meats and other proteins  Aim to eat 5-6 ounce-equivalents of protein each day. Examples of 1 ounce-equivalent of protein include 1 egg, 1/2 cup nuts or seeds, or 1 tablespoon (16 g) peanut butter. A cut of meat or fish that is the size of a deck of cards is about 3-4 ounce-equivalents.  Of the protein you eat each week, try to have at least 8 ounces come from seafood. This includes salmon, trout, herring, and anchovies.  Dairy  Aim to eat 3 cup-equivalents of fat-free or low-fat dairy each day. Examples of 1 cup-equivalent of dairy include 1 cup (240 mL) milk, 8 ounces (250 g) yogurt, 1½ ounces (44 g) natural cheese, or 1 cup (240 mL) fortified soy milk.  Fats and oils  Aim for about 5 teaspoons (21 g) per day. Choose monounsaturated fats, such as canola and olive oils, avocados, peanut butter, and most nuts, or polyunsaturated fats, such as sunflower, corn, and soybean oils, walnuts, pine nuts, sesame seeds, sunflower seeds, and flaxseed.  Beverages  Aim for six 8-oz glasses of water per day. Limit coffee to three to five 8-oz cups per day.  Limit caffeinated beverages that have added calories, such as soda and energy drinks.  Limit alcohol intake to no more than 1 drink a day for nonpregnant women and 2 drinks a day  for men. One drink equals 12 oz of beer (355 mL), 5 oz of wine (148 mL), or 1½ oz of hard liquor (44 mL).  Seasoning and other foods  Avoid adding excess amounts of salt to your foods. Try flavoring foods with herbs and spices instead of salt.  Avoid adding sugar to foods.  Try using oil-based dressings, sauces, and spreads instead of solid fats.  This information is based on general [place] nutrition guidelines. For more information, visit choosemyplate.gov. Exact amounts may vary based on your nutrition needs.  Summary  A healthy eating plan may help you to maintain a healthy weight, reduce the risk of chronic diseases, and stay active throughout your life.  Plan your meals. Make sure you eat the right portions of a variety of nutrient-rich foods.  Try baking, boiling, grilling, or broiling instead of frying.  Choose healthy options in all settings, including home, work, school, restaurants, or stores.  This information is not intended to replace advice given to you by your health care provider. Make sure you discuss any questions you have with your health care provider.  Document Revised: 04/01/2019 Document Reviewed: 04/01/2019  [employer] Patient Education © 2021 [employer]    Exercising to Stay Healthy  To become healthy and stay healthy, it is recommended that you do moderate-intensity and vigorous-intensity exercise. You can tell that you are exercising at a moderate intensity if your heart starts beating faster and you start breathing faster but can still hold a conversation. You can tell that you are exercising at a vigorous intensity if you are breathing much harder and faster and cannot hold a conversation while exercising.  Exercising regularly is important. It has many health benefits, such as:  Improving overall fitness, flexibility, and endurance.  Increasing bone density.  Helping with weight control.  Decreasing body fat.  Increasing muscle strength.  Reducing stress and tension.  Improving overall  health.  How often should I exercise?  Choose an activity that you enjoy, and set realistic goals. Your health care provider can help you make an activity plan that works for you.  Exercise regularly as told by your health care provider. This may include:  Doing strength training two times a week, such as:  Lifting weights.  Using resistance bands.  Push-ups.  Sit-ups.  Yoga.  Doing a certain intensity of exercise for a given amount of time. Choose from these options:  A total of 150 minutes of moderate-intensity exercise every week.  A total of 75 minutes of vigorous-intensity exercise every week.  A mix of moderate-intensity and vigorous-intensity exercise every week.  Children, pregnant women, people who have not exercised regularly, people who are overweight, and older adults may need to talk with a health care provider about what activities are safe to do. If you have a medical condition, be sure to talk with your health care provider before you start a new exercise program.  What are some exercise ideas?    Moderate-intensity exercise ideas include:  Walking 1 mile (1.6 km) in about 15 minutes.  Biking.  Hiking.  Golfing.  Dancing.  Water aerobics.  Vigorous-intensity exercise ideas include:  Walking 4.5 miles (7.2 km) or more in about 1 hour.  Jogging or running 5 miles (8 km) in about 1 hour.  Biking 10 miles (16.1 km) or more in about 1 hour.  Lap swimming.  Roller-skating or in-line skating.  Cross-country skiing.  Vigorous competitive sports, such as football, basketball, and soccer.  Jumping rope.  Aerobic dancing.  What are some everyday activities that can help me to get exercise?  Yard work, such as:  Pushing a .  Raking and bagging leaves.  Washing your car.  Pushing a stroller.  Shoveling snow.  Gardening.  Washing windows or floors.  How can I be more active in my day-to-day activities?  Use stairs instead of an elevator.  Take a walk during your lunch break.  If you drive, park your car  farther away from your work or school.  If you take public transportation, get off one stop early and walk the rest of the way.  Stand up or walk around during all of your indoor phone calls.  Get up, stretch, and walk around every 30 minutes throughout the day.  Enjoy exercise with a friend. Support to continue exercising will help you keep a regular routine of activity.  What guidelines can I follow while exercising?  Before you start a new exercise program, talk with your health care provider.  Do not exercise so much that you hurt yourself, feel dizzy, or get very short of breath.  Wear comfortable clothes and wear shoes with good support.  Drink plenty of water while you exercise to prevent dehydration or heat stroke.  Work out until your breathing and your heartbeat get faster.  Where to find more information  [place] Department of Health and Human Services: www.hhs.gov  Centers for Disease Control and Prevention (CDC): www.cdc.gov  Summary  Exercising regularly is important. It will improve your overall fitness, flexibility, and endurance.  Regular exercise also will improve your overall health. It can help you control your weight, reduce stress, and improve your bone density.  Do not exercise so much that you hurt yourself, feel dizzy, or get very short of breath.  Before you start a new exercise program, talk with your health care provider.  This information is not intended to replace advice given to you by your health care provider. Make sure you discuss any questions you have with your health care provider.  Document Revised: 11/30/2018 Document Reviewed: 11/08/2018  [employer] Patient Education © 2021 [employer]    Debrox drops

## 2022-09-22 NOTE — PROGRESS NOTES
New Patient Office Visit      Date: 2022   Patient Name: Julian Willoughby  : 1980   MRN: [id number]     Chief Complaint:    Chief Complaint   Patient presents with   • Establish Care   • Hemorrhoids      week of august        History of Present Illness: Julian Willoughby is a 42 y.o. male who is here today to establish care.    HPI   Hypertension  Patient has followed with Dr. Foster of cardiology.  Last seen 2022.  Previously completed 24 ambulatory blood pressure monitoring with normal serum catecholamines as well as aldosterone to renin ratio.  Currently well controlled on amlodipine valsartan 5 mg - 160 mg.  Patient has had fasting lipid panel and CMP ordered to be performed prior to next visit in 2023.    Hemorrhoids  In late August patient noticed that he had perirectal pain and light bleeding when wiping.  Noted mild Irritation.  Father with history of hemorrhoids.  Mother with history of colorectal cancer.  Due to this concern he presented to urgent care.  He was diagnosed with external hemorrhoid and was prescribed Analpram cream to be applied 3 times daily.  He utilized this medication for approximately 2 weeks in addition to sitz bath's, increasing fiber intake and increasing fluid intake.  Symptoms of since resolved.  Currently having easy bowel movements each day without straining, no continued bleeding.      Subjective      Review of Systems:   Review of Systems   Constitutional: Positive for unexpected weight gain. Negative for activity change, appetite change, chills, fatigue and fever.   HENT: Positive for hearing loss (Reports subjective decreased hearing, notes he has had this in the past with increasing cerumen). Negative for congestion, ear pain, nosebleeds, postnasal drip, rhinorrhea, sinus pressure, sore throat, swollen glands and trouble swallowing.    Eyes: Negative for blurred vision, double vision, pain and visual disturbance.   Respiratory:  Negative for apnea, cough, choking, chest tightness and shortness of breath.    Cardiovascular: Negative for chest pain, palpitations and leg swelling.   Gastrointestinal: Negative for abdominal distention, abdominal pain, blood in stool, constipation, diarrhea, nausea, vomiting and GERD.   Endocrine: Negative for polydipsia and polyuria.   Genitourinary: Negative for difficulty urinating, dysuria, hematuria, nocturia, urgency and urinary incontinence.   Musculoskeletal: Negative for arthralgias, back pain, gait problem and joint swelling.   Skin: Positive for rash (No rash present at the time, but endorses intermittent seborrheic dermatitis on chest.). Negative for wound and bruise.   Neurological: Negative for dizziness, numbness, headache and confusion.   Psychiatric/Behavioral: Negative for sleep disturbance and depressed mood. The patient is not nervous/anxious.        Past Medical History:   Past Medical History:   Diagnosis Date   • Bronchitis     Resolved   • Chicken pox    • External hemorrhoid    • Hyperlipidemia    • Hypertension    • Internal hemorrhoid        Past Surgical History:   Past Surgical History:   Procedure Laterality Date   • EYE SURGERY         Family History:   Family History   Problem Relation Age of Onset   • Cancer Mother    • Colon cancer Mother 67   • Hypertension Father    • High cholesterol Father    • No Known Problems Brother    • Hypertension Other        Social History:   Social History     Socioeconomic History   • Marital status: Single   Tobacco Use   • Smoking status: Never Smoker   • Smokeless tobacco: Never Used   Vaping Use   • Vaping Use: Never used   Substance and Sexual Activity   • Alcohol use: No   • Drug use: No   • Sexual activity: Not Currently     Partners: Female       Medications:     Current Outpatient Medications:   •  amLODIPine-valsartan (EXFORGE) 5-160 MG per tablet, Take 1 tablet by mouth Daily., Disp: 30 tablet, Rfl: 11  •  cetirizine (zyrTEC) 10 MG  tablet, Take 10 mg by mouth Daily., Disp: , Rfl:   •  Hydrocortisone Ace-Pramoxine 1-1 % rectal cream, As Needed., Disp: , Rfl:   •  Multiple Vitamin (MULTI-VITAMIN DAILY PO), Take  by mouth As Needed., Disp: , Rfl:   •  Omega-3 Fatty Acids (fish oil) 1000 MG capsule capsule, Take 1,000 mg by mouth Daily., Disp: , Rfl:   •  pramoxine-hydrocortisone (ANALPRAM HC) 1-1 % cream, Apply 1 application topically to the appropriate area as directed 3 (Three) Times a Day., Disp: , Rfl:   •  Red Yeast Rice Extract 600 MG capsule, Take 1,200 mg by mouth Daily., Disp: , Rfl:   •  triamcinolone (KENALOG) 0.1 % cream, APPLY TOPICALLY TO THE AFFECTED AREA TWICE DAILY AS NEEDED. AVOID FACE AND GROIN, Disp: , Rfl:     Allergies:   Allergies   Allergen Reactions   • Claritin [Loratadine] Other (See Comments)     Depressed     • Sudafed [Pseudoephedrine Hcl] Other (See Comments)     depressed   • Sulfa Antibiotics GI Intolerance   • Tobramycin Unknown (See Comments)       Immunizations:  Td/Tdap(Booster Q 10 yrs):  Up to date, received 2019  Flu (Yearly): Obtains yearly for work.  Pneumonia: Up-to-date  Immunization History   Administered Date(s) Administered   • COVID-19 (PFIZER) BIVALENT BOOSTER 12+YRS 09/21/2022   • COVID-19 (PFIZER) PURPLE CAP 01/12/2021, 02/03/2021, 10/04/2021        Colorectal Screening:   No indication at this time, mother has history of colorectal cancer diagnosed at age 67.  Last Completed Colonoscopy          COLORECTAL CANCER SCREENING (COLONOSCOPY - Every 5 Years) Next due on 7/12/2024 07/12/2019  COLONOSCOPY (Patient-Reported (Performed Externally))              Hep C (Age 18-79 once):  due  HIV (Age 15-65 once) : negative January of 2022    Lipid panel:   The 10-year ASCVD risk score (Tj WASHINGTON Jr., et al., 2013) is: 1.7%    Values used to calculate the score:      Age: 42 years      Sex: Male      Is Non- : No      Diabetic: No      Tobacco smoker: No      Systolic Blood  "Pressure: 112 mmHg      Is BP treated: Yes      HDL Cholesterol: 41 mg/dL      Total Cholesterol: 186 mg/dL    Dermatology: has seen previously, evaluated for seborrheic dermatitis. Using Zbar soap.  Dentist: regularly sees dentist, brushes twice daily.    Tobacco Use: Low Risk    • Smoking Tobacco Use: Never Smoker   • Smokeless Tobacco Use: Never Used       Social History     Substance and Sexual Activity   Alcohol Use No        Social History     Substance and Sexual Activity   Drug Use No        Diet/Physical activity:   Attends to avoid dairy, eating a high-fiber diet with fruits and vegetables.  Eats a well-balanced diet, tries to avoid red meat is much as possible.  Not getting much exercise at this point.  Reports that he has done CrossFit in the past with good results.  Hoping to lose weight soon.    Sexual health: Not active, interested in female partners.  No history of STI    PHQ-2 Depression Screening  PHQ-9 Total Score: 0           Objective     Physical Exam:  Vital Signs:   Vitals:    09/22/22 1432   BP: 112/72   BP Location: Left arm   Patient Position: Sitting   Cuff Size: Adult   Pulse: 72   Resp: 20   Temp: 97.7 °F (36.5 °C)   TempSrc: Temporal   Weight: 94.9 kg (209 lb 2 oz)   Height: 180.3 cm (71\")   PainSc: 0-No pain     Body mass index is 29.17 kg/m².    Physical Exam  Vitals reviewed. Exam conducted with a chaperone present.   Constitutional:       General: He is not in acute distress.     Appearance: Normal appearance. He is not ill-appearing or toxic-appearing.   HENT:      Head: Normocephalic and atraumatic.      Right Ear: Tympanic membrane and external ear normal.      Left Ear: Tympanic membrane and external ear normal.      Ears:      Comments: Moderate cerumen bilaterally, TMs normal.     Nose: Nose normal. No congestion.      Mouth/Throat:      Mouth: Mucous membranes are moist.      Pharynx: No oropharyngeal exudate or posterior oropharyngeal erythema.   Eyes:      General: No " scleral icterus.        Right eye: No discharge.         Left eye: No discharge.      Extraocular Movements: Extraocular movements intact.      Pupils: Pupils are equal, round, and reactive to light.   Cardiovascular:      Rate and Rhythm: Normal rate and regular rhythm.      Pulses: Normal pulses.      Heart sounds: Normal heart sounds. No murmur heard.    No friction rub. No gallop.   Pulmonary:      Effort: Pulmonary effort is normal. No respiratory distress.      Breath sounds: No stridor. No wheezing or rales.   Abdominal:      General: Abdomen is flat. Bowel sounds are normal. There is no distension.      Palpations: Abdomen is soft. There is no mass.      Tenderness: There is no abdominal tenderness. There is no guarding.   Genitourinary:     Rectum: No anal fissure.          Comments: Skin tag noted at 6 o'clock position just outside of the anus.  No other visible external hemorrhoids or lesions.  Musculoskeletal:         General: No swelling or deformity. Normal range of motion.      Cervical back: Normal range of motion and neck supple. No rigidity or tenderness.   Lymphadenopathy:      Cervical: No cervical adenopathy.   Skin:     General: Skin is warm and dry.      Capillary Refill: Capillary refill takes less than 2 seconds.      Coloration: Skin is not jaundiced or pale.   Neurological:      General: No focal deficit present.      Mental Status: He is alert and oriented to person, place, and time. Mental status is at baseline.   Psychiatric:         Mood and Affect: Mood normal.         Behavior: Behavior normal.         Judgment: Judgment normal.         Procedures    Results:     Labs:   TSH   Date Value Ref Range Status   11/27/2020 2.100 0.270 - 4.200 uIU/mL Final        Imaging:   No valid procedures specified.     Assessment / Plan      Assessment/Plan:   Problem List Items Addressed This Visit        Cardiac and Vasculature    Mixed hyperlipidemia    Relevant Orders    Lipid Panel     Dyslipidemia  Patient taking omega-3 fish oil as well as red yeast rice extract daily.  Previously had elevated LDL.  Counseled on diet and exercise.  We will repeat lipid panel today.       Symptoms and Signs    Labile hypertension  Well-controlled at this time on amlodipine valsartan.  Continue current regimen.      Other Visit Diagnoses     Decreased hearing of both ears    -  Primary  Patient endorses subjective difficulty hearing at times.  Has had cerumen impaction in the past.  Previously followed with [facility].  Would like to be rereferred.    Relevant Orders    Ambulatory Referral to Audiology (Completed)    Health care maintenance   Updated on current health maintenance discussed diet and exercise extensively.  Encourage patient to attempt to lose 10% of body weight which will help with his hypertension as well.  We will obtain hepatitis C antibody at this time.  Patient with family history of colorectal cancer in mother, had colonoscopy that was notable for internal hemorrhoid July 2019.  Patient reports that he was counseled to do a 5-year follow-up.       Relevant Orders    Hepatitis C Antibody          Healthcare Maintenance:  Counseling provided based on age appropriate USPSTF guidelines.  BMI is >= 25 and <30. (Overweight) The following options were offered after discussion;: weight loss educational material (shared in after visit summary), exercise counseling/recommendations and nutrition counseling/recommendations    Julian Cade Willoughby voices understanding and acceptance of this advice and will call back with any further questions or concerns. AVS with preventive healthcare tips printed for patient.         Follow Up:   Return in about 1 year (around 9/22/2023) for Annual.  This time is not included in the time used to support the E/M service also reported today.    Lucio Mesa MD   [facility] – [place] [facility]

## 2022-09-23 LAB — HCV AB SER DONR QL: NORMAL

## 2022-11-10 ENCOUNTER — OFFICE VISIT (OUTPATIENT)
Dept: INTERNAL MEDICINE | Facility: CLINIC | Age: 42
End: 2022-11-10

## 2022-11-10 VITALS
OXYGEN SATURATION: 79 % | WEIGHT: 220.5 LBS | DIASTOLIC BLOOD PRESSURE: 70 MMHG | BODY MASS INDEX: 30.75 KG/M2 | HEART RATE: 82 BPM | TEMPERATURE: 97.8 F | SYSTOLIC BLOOD PRESSURE: 140 MMHG | RESPIRATION RATE: 20 BRPM

## 2022-11-10 DIAGNOSIS — L20.9 ATOPIC DERMATITIS, UNSPECIFIED TYPE: ICD-10-CM

## 2022-11-10 DIAGNOSIS — Q18.1 CYST ON EAR: Primary | ICD-10-CM

## 2022-11-10 DIAGNOSIS — R09.89 LABILE HYPERTENSION: ICD-10-CM

## 2022-11-10 PROCEDURE — 99214 OFFICE O/P EST MOD 30 MIN: CPT | Performed by: STUDENT IN AN ORGANIZED HEALTH CARE EDUCATION/TRAINING PROGRAM

## 2022-11-10 NOTE — PROGRESS NOTES
Follow Up Office Visit      Date: 11/10/2022   Patient Name: Julian Willoughby  : 1980   MRN: [id number]     Chief Complaint:    Chief Complaint   Patient presents with   • Mass     In left ear lobe       History of Present Illness: Julian Willoughby is a 42 y.o. male who is here today for evaluation of earlobe lesion, follow-up hypertension.    HPI  Left earlobe lesion was first noticed 1 week ago.  States there is no erythema, warmth, discharge.  Mild tenderness to palpation otherwise there is no pain.  Denies trauma to the region.  Never had ear pierced.  No history of skin cancers.  Scheduled to see dermatology: Dr. Parham. SHANTELL, in 2 months.    Rash  Patient states this is a chronic problem he has been told that it was likely seborrheic dermatitis in the past and was recommended to utilize Selsun Blue.  This rash returned over the past 3 days, which he feels is related to him eating large amounts of cheese.  States he is followed up with his allergist and has had negative testing for milk allergy but does note that this seems to appear whenever he is consumed excessive dairy products.  He has not tried Selsun Blue to relieve the symptoms.  In the past has used triamcinolone with good relief.  Endorses mild itching..     Hypertension  Follows with Dr. Foster of cardiology.  Has appointment 2023.  Reports good compliance with amlodipine valsartan, 5 mg / 160 mg.  Has not been checking blood pressure at home.  Denies chest pain, palpitations, dyspnea on exertion.  Patient not following low-sodium diet.  Has gained approximately 11 pounds since last visit.    Subjective      Review of Systems:   Review of Systems    I have reviewed the patients family history, social history, past medical history, past surgical history and have updated it as appropriate.     Medications:     Current Outpatient Medications:   •  amLODIPine-valsartan (EXFORGE) 5-160 MG per tablet, Take 1 tablet by mouth  Daily., Disp: 30 tablet, Rfl: 11  •  cetirizine (zyrTEC) 10 MG tablet, Take 10 mg by mouth Daily., Disp: , Rfl:   •  Hydrocortisone Ace-Pramoxine 1-1 % rectal cream, As Needed., Disp: , Rfl:   •  Multiple Vitamin (MULTI-VITAMIN DAILY PO), Take  by mouth As Needed., Disp: , Rfl:   •  Omega-3 Fatty Acids (fish oil) 1000 MG capsule capsule, Take 1,000 mg by mouth Daily., Disp: , Rfl:   •  Red Yeast Rice Extract 600 MG capsule, Take 1,200 mg by mouth Daily., Disp: , Rfl:     Allergies:   Allergies   Allergen Reactions   • Claritin [Loratadine] Other (See Comments)     Depressed     • Sudafed [Pseudoephedrine Hcl] Other (See Comments)     depressed   • Sulfa Antibiotics GI Intolerance   • Tobramycin Unknown (See Comments)       Objective     Physical Exam: Please see above  Vital Signs:   Vitals:    11/10/22 1604   BP: 140/70   BP Location: Left arm   Patient Position: Sitting   Cuff Size: Adult   Pulse: 82   Resp: 20   Temp: 97.8 °F (36.6 °C)   TempSrc: Temporal   SpO2: (!) 79%   Weight: 100 kg (220 lb 8 oz)   PainSc: 0-No pain     Body mass index is 30.75 kg/m².    Physical Exam  Vitals (Believe pulse ox was an error, no cyanosis, patient breathing normally on room air.) reviewed.   Constitutional:       General: He is not in acute distress.     Appearance: Normal appearance. He is obese. He is not ill-appearing or toxic-appearing.   HENT:      Head: Normocephalic and atraumatic.      Ears:      Comments: Lobe of left ear with 3 mm round freely mobile cystic lesion.  No overlying erythema or warmth.  Eyes:      Extraocular Movements: Extraocular movements intact.      Conjunctiva/sclera: Conjunctivae normal.   Cardiovascular:      Rate and Rhythm: Normal rate and regular rhythm.      Pulses: Normal pulses.      Heart sounds: Normal heart sounds.   Pulmonary:      Effort: Pulmonary effort is normal. No respiratory distress.      Breath sounds: Normal breath sounds. No wheezing.   Musculoskeletal:      Cervical back:  Normal range of motion. No rigidity.   Skin:     General: Skin is warm and dry.      Capillary Refill: Capillary refill takes less than 2 seconds.      Findings: Rash present.      Comments: Xerotic erythematous patch over left chest.  See image   Neurological:      General: No focal deficit present.      Mental Status: He is alert and oriented to person, place, and time.       Psychiatric:         Mood and Affect: Mood normal.         Behavior: Behavior normal.         Procedures    Results:   Labs:   TSH   Date Value Ref Range Status   11/27/2020 2.100 0.270 - 4.200 uIU/mL Final        Imaging:   No valid procedures specified.     Assessment / Plan      Assessment/Plan:   Problem List Items Addressed This Visit        Endocrine and Metabolic    BMI 30.0-30.9,adult    Current Assessment & Plan     Patient's obesity is worsening.  Is gained greater than 10 pounds since last visit.  States he has not been exercising or following diet.  Extensively discussed appropriate exercise, both aerobic and resistance training.  Discussed diet as well, provided information for DASH diet and superfoods handout in AVS.            Skin    Atopic dermatitis    Current Assessment & Plan     Patient has had recurrent rash on chest which she feels is associated with exposure to cheese despite negative allergy testing for milk products.  It does have the appearance of mild atopic dermatitis which can be worsened from food allergens.  Other differential diagnosis includes seborrheic dermatitis.  Recommend utilizing over-the-counter Selsun Blue and 1% hydrocortisone cream for improvement.            Symptoms and Signs    Labile hypertension    Overview     Follows with Dr. Foster of cardiology. Last seen 8/26/2022.  Previously completed 24 ambulatory blood pressure monitoring with normal serum catecholamines as well as aldosterone to renin ratio.         Current Assessment & Plan     Hypertension is worsening.  Dietary sodium  restriction.  Regular aerobic exercise.  Continue current medications.  Blood pressure will be reassessed at the next regular appointment.        Other Visit Diagnoses     Cyst on ear    -  Primary      Cyst likely a benign sebaceous cyst.  Patient has follow-up with dermatology in 2 months recommend discussing this with them at that time.    Follow Up:   Return if symptoms worsen or fail to improve.      Lucio Mesa MD  [facility] [place] [facility] [place]

## 2022-11-10 NOTE — ASSESSMENT & PLAN NOTE
Hypertension is worsening.  Dietary sodium restriction.  Regular aerobic exercise.  Continue current medications.  Blood pressure will be reassessed at the next regular appointment.

## 2022-11-10 NOTE — ASSESSMENT & PLAN NOTE
Patient's obesity is worsening.  Is gained greater than 10 pounds since last visit.  States he has not been exercising or following diet.  Extensively discussed appropriate exercise, both aerobic and resistance training.  Discussed diet as well, provided information for DASH diet and superfoods handout in AVS.

## 2022-11-10 NOTE — ASSESSMENT & PLAN NOTE
Patient has had recurrent rash on chest which she feels is associated with exposure to cheese despite negative allergy testing for milk products.  It does have the appearance of mild atopic dermatitis which can be worsened from food allergens.  Other differential diagnosis includes seborrheic dermatitis.  Recommend utilizing over-the-counter Selsun Blue and 1% hydrocortisone cream for improvement.

## 2022-11-10 NOTE — PATIENT INSTRUCTIONS
"1 % hydrocortisone cream. Try selsuhiral del cidtps://www.nhlbi.nih.gov/files/docs/public/heart/dash_brief.pdf\">   DASH Eating Plan  DASH stands for Dietary Approaches to Stop Hypertension. The DASH eating plan is a healthy eating plan that has been shown to:  Reduce high blood pressure (hypertension).  Reduce your risk for type 2 diabetes, heart disease, and stroke.  Help with weight loss.  What are tips for following this plan?  Reading food labels  Check food labels for the amount of salt (sodium) per serving. Choose foods with less than 5 percent of the Daily Value of sodium. Generally, foods with less than 300 milligrams (mg) of sodium per serving fit into this eating plan.  To find whole grains, look for the word \"whole\" as the first word in the ingredient list.  Shopping  Buy products labeled as \"low-sodium\" or \"no salt added.\"  Buy fresh foods. Avoid canned foods and pre-made or frozen meals.  Cooking  Avoid adding salt when cooking. Use salt-free seasonings or herbs instead of table salt or sea salt. Check with your health care provider or pharmacist before using salt substitutes.  Do not abraham foods. Cook foods using healthy methods such as baking, boiling, grilling, roasting, and broiling instead.  Cook with heart-healthy oils, such as olive, canola, avocado, soybean, or sunflower oil.  Meal planning    Eat a balanced diet that includes:  4 or more servings of fruits and 4 or more servings of vegetables each day. Try to fill one-half of your plate with fruits and vegetables.  6-8 servings of whole grains each day.  Less than 6 oz (170 g) of lean meat, poultry, or fish each day. A 3-oz (85-g) serving of meat is about the same size as a deck of cards. One egg equals 1 oz (28 g).  2-3 servings of low-fat dairy each day. One serving is 1 cup (237 mL).  1 serving of nuts, seeds, or beans 5 times each week.  2-3 servings of heart-healthy fats. Healthy fats called omega-3 fatty acids are found in foods such as walnuts, " flaxseeds, fortified milks, and eggs. These fats are also found in cold-water fish, such as sardines, salmon, and mackerel.  Limit how much you eat of:  Canned or prepackaged foods.  Food that is high in trans fat, such as some fried foods.  Food that is high in saturated fat, such as fatty meat.  Desserts and other sweets, sugary drinks, and other foods with added sugar.  Full-fat dairy products.  Do not salt foods before eating.  Do not eat more than 4 egg yolks a week.  Try to eat at least 2 vegetarian meals a week.  Eat more home-cooked food and less restaurant, buffet, and fast food.     Lifestyle  When eating at a restaurant, ask that your food be prepared with less salt or no salt, if possible.  If you drink alcohol:  Limit how much you use to:  0-1 drink a day for women who are not pregnant.  0-2 drinks a day for men.  Be aware of how much alcohol is in your drink. In the [place] one drink equals one 12 oz bottle of beer (355 mL), one 5 oz glass of wine (148 mL), or one 1½ oz glass of hard liquor (44 mL).  General information  Avoid eating more than 2,300 mg of salt a day. If you have hypertension, you may need to reduce your sodium intake to 1,500 mg a day.  Work with your health care provider to maintain a healthy body weight or to lose weight. Ask what an ideal weight is for you.  Get at least 30 minutes of exercise that causes your heart to beat faster (aerobic exercise) most days of the week. Activities may include walking, swimming, or biking.  Work with your health care provider or dietitian to adjust your eating plan to your individual calorie needs.  What foods should I eat?  Fruits  All fresh, dried, or frozen fruit. Canned fruit in natural juice (without added sugar).  Vegetables  Fresh or frozen vegetables (raw, steamed, roasted, or grilled). Low-sodium or reduced-sodium tomato and vegetable juice. Low-sodium or reduced-sodium tomato sauce and tomato paste. Low-sodium or reduced-sodium canned  vegetables.  Grains  Whole-grain or whole-wheat bread. Whole-grain or whole-wheat pasta. Brown rice. Oatmeal. Quinoa. Bulgur. Whole-grain and low-sodium cereals. Natalia bread. Low-fat, low-sodium crackers. Whole-wheat flour tortillas.  Meats and other proteins  Skinless chicken or turkey. Ground chicken or turkey. Pork with fat trimmed off. Fish and seafood. Egg whites. Dried beans, peas, or lentils. Unsalted nuts, nut butters, and seeds. Unsalted canned beans. Lean cuts of beef with fat trimmed off. Low-sodium, lean precooked or cured meat, such as sausages or meat loaves.  Dairy  Low-fat (1%) or fat-free (skim) milk. Reduced-fat, low-fat, or fat-free cheeses. Nonfat, low-sodium ricotta or cottage cheese. Low-fat or nonfat yogurt. Low-fat, low-sodium cheese.  Fats and oils  Soft margarine without trans fats. Vegetable oil. Reduced-fat, low-fat, or light mayonnaise and salad dressings (reduced-sodium). Canola, safflower, olive, avocado, soybean, and sunflower oils. Avocado.  Seasonings and condiments  Herbs. Spices. Seasoning mixes without salt.  Other foods  Unsalted popcorn and pretzels. Fat-free sweets.  The items listed above may not be a complete list of foods and beverages you can eat. Contact a dietitian for more information.  What foods should I avoid?  Fruits  Canned fruit in a light or heavy syrup. Fried fruit. Fruit in cream or butter sauce.  Vegetables  Creamed or fried vegetables. Vegetables in a cheese sauce. Regular canned vegetables (not low-sodium or reduced-sodium). Regular canned tomato sauce and paste (not low-sodium or reduced-sodium). Regular tomato and vegetable juice (not low-sodium or reduced-sodium). Pickles. Olives.  Grains  Baked goods made with fat, such as croissants, muffins, or some breads. Dry pasta or rice meal packs.  Meats and other proteins  Fatty cuts of meat. Ribs. Fried meat. Darnell. Bologna, salami, and other precooked or cured meats, such as sausages or meat loaves. Fat from  the back of a pig (fatback). Bratwurst. Salted nuts and seeds. Canned beans with added salt. Canned or smoked fish. Whole eggs or egg yolks. Chicken or turkey with skin.  Dairy  Whole or 2% milk, cream, and half-and-half. Whole or full-fat cream cheese. Whole-fat or sweetened yogurt. Full-fat cheese. Nondairy creamers. Whipped toppings. Processed cheese and cheese spreads.  Fats and oils  Butter. Stick margarine. Lard. Shortening. Ghee. Darnell fat. Tropical oils, such as coconut, palm kernel, or palm oil.  Seasonings and condiments  Onion salt, garlic salt, seasoned salt, table salt, and sea salt. Worcestershire sauce. Tartar sauce. Barbecue sauce. Teriyaki sauce. Soy sauce, including reduced-sodium. Steak sauce. Canned and packaged gravies. Fish sauce. Oyster sauce. Cocktail sauce. Store-bought horseradish. Ketchup. Mustard. Meat flavorings and tenderizers. Bouillon cubes. Hot sauces. Pre-made or packaged marinades. Pre-made or packaged taco seasonings. Relishes. Regular salad dressings.  Other foods  Salted popcorn and pretzels.  The items listed above may not be a complete list of foods and beverages you should avoid. Contact a dietitian for more information.  Where to find more information  National Heart, Lung, and Blood [place]: www.nhlbi.nih.gov  American Heart Association: www.heart.org  Academy of Nutrition and Dietetics: www.eatright.org  National Kidney Foundation: www.kidney.org  Summary  The DASH eating plan is a healthy eating plan that has been shown to reduce high blood pressure (hypertension). It may also reduce your risk for type 2 diabetes, heart disease, and stroke.  When on the DASH eating plan, aim to eat more fresh fruits and vegetables, whole grains, lean proteins, low-fat dairy, and heart-healthy fats.  With the DASH eating plan, you should limit salt (sodium) intake to 2,300 mg a day. If you have hypertension, you may need to reduce your sodium intake to 1,500 mg a day.  Work with your  health care provider or dietitian to adjust your eating plan to your individual calorie needs.  This information is not intended to replace advice given to you by your health care provider. Make sure you discuss any questions you have with your health care provider.  Document Revised: 11/20/2020 Document Reviewed: 11/20/2020  [employer] Patient Education © 2021 [employer]          Healthy Delicious Recipes from [employer]: https://[employer]/  [place] Plant Based Meal Recipes: https://Boomerang.com/  Heart Healthy Recipes from [employer]: https://abcardio.org/wp-content/uploads/2020/06/ABC_Cookbook.pdf  [place] Healthy Living Guide: https://www.hsph.[employer].edu/nutritionsource/2022/01/06/healthy-living-guide-4329-5177/  SNAP Cookbook for Budgets: http://ongoFoneshow.net/dss/documents/good-and-cheap.pdf

## 2023-02-10 ENCOUNTER — OFFICE VISIT (OUTPATIENT)
Dept: INTERNAL MEDICINE | Facility: CLINIC | Age: 43
End: 2023-02-10
Payer: COMMERCIAL

## 2023-02-10 VITALS
TEMPERATURE: 98 F | DIASTOLIC BLOOD PRESSURE: 70 MMHG | BODY MASS INDEX: 31.38 KG/M2 | SYSTOLIC BLOOD PRESSURE: 118 MMHG | WEIGHT: 225 LBS | OXYGEN SATURATION: 97 % | HEART RATE: 101 BPM | RESPIRATION RATE: 20 BRPM

## 2023-02-10 DIAGNOSIS — J32.9 BACTERIAL SINUSITIS: Primary | ICD-10-CM

## 2023-02-10 DIAGNOSIS — B96.89 BACTERIAL SINUSITIS: Primary | ICD-10-CM

## 2023-02-10 DIAGNOSIS — R09.89 LABILE HYPERTENSION: ICD-10-CM

## 2023-02-10 DIAGNOSIS — J30.9 ALLERGIC RHINITIS, UNSPECIFIED SEASONALITY, UNSPECIFIED TRIGGER: ICD-10-CM

## 2023-02-10 PROCEDURE — 99214 OFFICE O/P EST MOD 30 MIN: CPT | Performed by: STUDENT IN AN ORGANIZED HEALTH CARE EDUCATION/TRAINING PROGRAM

## 2023-02-10 RX ORDER — KETOCONAZOLE 20 MG/G
CREAM TOPICAL
COMMUNITY
Start: 2023-01-03

## 2023-02-10 RX ORDER — FLUTICASONE PROPIONATE 50 MCG
1 SPRAY, SUSPENSION (ML) NASAL DAILY
Qty: 16 G | Refills: 3 | Status: SHIPPED | OUTPATIENT
Start: 2023-02-10

## 2023-02-10 RX ORDER — CEPHALEXIN 500 MG/1
1 CAPSULE ORAL EVERY 12 HOURS SCHEDULED
COMMUNITY
Start: 2023-02-06 | End: 2023-02-23

## 2023-02-10 NOTE — PROGRESS NOTES
Follow Up Office Visit      Date: 02/10/2023   Patient Name: Julian Willoughby  : 1980   MRN: [id number]     Chief Complaint:    Chief Complaint   Patient presents with   • Wheezing   • Cough       History of Present Illness: Julian Willoughby is a 42 y.o. male who is here today for acute visit.    HPI    The patient is here today for acute visit.    Today, he reports he is experiencing cough and wheezing. He was evaluated on 2021 for wheezing and was treated with a steroid pack. He states that what he currently is experiencing is similar to that time but not as bad. He reports that onset of symptoms presented approximately 3 weeks ago. He felt like he had sinusitis, with pain and congestion. He also developed a cough and took aspirin for relief. He states that the congestion subsided, however, his cough remained after 1.5 weeks. On 2023, he saw his allergist, Dr Marquez, since he was told if he developed any sinusitis symptoms to be seen. He evaluated his breathing and performed a breath test on him. His lung capacity was at 80 percent, and he was told he was not asthmatic. He was treated with Keflex 200 mg for 7 days. He states he still has a lingering cough and it has been difficult for him at work due to the coughing. He denies the cough worsens after exertion with ambulation. Last night, 2023, his nurse friend recommended he drink eucalyptus tea and he slightly felt better after drinking some. He denies any fever, chest pain or chills.     He reports a history of having allergy injections administered for 6 years which he noticed got better over time. Now that he no longer receives the injections, his symptoms have reoccurred. He currently takes cetirizine 10 mg. He has not had an allergy test since . He is severely allergic to MOLD and CATS. He denies having any cats.     He saw dermatology for dermatitis on his chest. He was recommended triamcinolone  ointment.    Subjective      Review of Systems:   Review of Systems    I have reviewed the patients family history, social history, past medical history, past surgical history and have updated it as appropriate.     Medications:     Current Outpatient Medications:   •  amLODIPine-valsartan (EXFORGE) 5-160 MG per tablet, Take 1 tablet by mouth Daily., Disp: 30 tablet, Rfl: 11  •  cephalexin (KEFLEX) 500 MG capsule, Take 1 capsule by mouth Every 12 (Twelve) Hours., Disp: , Rfl:   •  cetirizine (zyrTEC) 10 MG tablet, Take 10 mg by mouth Daily., Disp: , Rfl:   •  Hydrocortisone Ace-Pramoxine 1-1 % rectal cream, As Needed., Disp: , Rfl:   •  ketoconazole (NIZORAL) 2 % cream, APPLY TOPICALLY TO THE AFFECTED AREA AT BEDTIME, Disp: , Rfl:   •  Multiple Vitamin (MULTI-VITAMIN DAILY PO), Take  by mouth As Needed., Disp: , Rfl:   •  Omega-3 Fatty Acids (fish oil) 1000 MG capsule capsule, Take 1,000 mg by mouth Daily., Disp: , Rfl:   •  Red Yeast Rice Extract 600 MG capsule, Take 1,200 mg by mouth Daily., Disp: , Rfl:   •  fluticasone (FLONASE) 50 MCG/ACT nasal spray, 1 spray into the nostril(s) as directed by provider Daily., Disp: 16 g, Rfl: 3    Allergies:   Allergies   Allergen Reactions   • Claritin [Loratadine] Other (See Comments)     Depressed     • Sudafed [Pseudoephedrine Hcl] Other (See Comments)     depressed   • Sulfa Antibiotics GI Intolerance   • Tobramycin Unknown (See Comments)       Objective     Physical Exam: Please see above  Vital Signs:   Vitals:    02/10/23 1301   BP: 118/70   BP Location: Left arm   Patient Position: Sitting   Cuff Size: Adult   Pulse: 101   Resp: 20   Temp: 98 °F (36.7 °C)   TempSrc: Temporal   SpO2: 97%   Weight: 102 kg (225 lb)   PainSc: 0-No pain     Body mass index is 31.38 kg/m².    Physical Exam  Vitals reviewed.   Constitutional:       General: He is not in acute distress.     Appearance: Normal appearance. He is obese. He is not ill-appearing or toxic-appearing.   HENT:       Head: Normocephalic and atraumatic.      Right Ear: External ear normal.      Left Ear: External ear normal.      Nose: Congestion present.      Mouth/Throat:      Mouth: Mucous membranes are moist.      Pharynx: No oropharyngeal exudate or posterior oropharyngeal erythema.      Comments: Post nasal drip  Eyes:      General: No scleral icterus.        Right eye: No discharge.         Left eye: No discharge.      Extraocular Movements: Extraocular movements intact.      Pupils: Pupils are equal, round, and reactive to light.   Cardiovascular:      Rate and Rhythm: Normal rate and regular rhythm.      Pulses: Normal pulses.      Heart sounds: Normal heart sounds.   Pulmonary:      Effort: Pulmonary effort is normal. No respiratory distress.      Breath sounds: Normal breath sounds. No stridor. No wheezing, rhonchi or rales.      Comments: Intermittent dry cough  Abdominal:      General: Abdomen is flat. There is no distension.   Musculoskeletal:         General: No swelling or deformity. Normal range of motion.      Cervical back: Normal range of motion. No rigidity.   Lymphadenopathy:      Cervical: No cervical adenopathy.   Skin:     General: Skin is warm and dry.      Capillary Refill: Capillary refill takes less than 2 seconds.      Coloration: Skin is not jaundiced or pale.   Neurological:      General: No focal deficit present.      Mental Status: He is alert and oriented to person, place, and time. Mental status is at baseline.   Psychiatric:         Mood and Affect: Mood normal.         Behavior: Behavior normal.         Judgment: Judgment normal.         Procedures    Results:   Labs:   TSH   Date Value Ref Range Status   11/27/2020 2.100 0.270 - 4.200 uIU/mL Final        Imaging:   No valid procedures specified.     Assessment / Plan      Assessment/Plan:   Problem List Items Addressed This Visit        Allergies and Adverse Reactions    Allergic rhinitis    Current Assessment & Plan     Chronic, worsening.  Add flonase. Discussed appropriate use         Relevant Medications    fluticasone (FLONASE) 50 MCG/ACT nasal spray       Symptoms and Signs    Labile hypertension    Overview     Follows with Dr. Foster of cardiology. Last seen 8/26/2022.  Previously completed 24 ambulatory blood pressure monitoring with normal serum catecholamines as well as aldosterone to renin ratio.        Other Visit Diagnoses     Bacterial sinusitis    -  Primary    Relevant Medications    cephalexin (KEFLEX) 500 MG capsule      continue keflex as prescribed by AI.      Follow Up:   Return if symptoms worsen or fail to improve.        Lucio Mesa MD  [facility]    Transcribed from ambient dictation for Lucio Mesa MD by Radha Demarco.  02/10/23   15:49 EST    Patient or patient representative verbalized consent to the visit recording.  I have personally performed the services described in this document as transcribed by the above individual, and it is both accurate and complete.

## 2023-02-23 ENCOUNTER — TELEPHONE (OUTPATIENT)
Dept: CARDIOLOGY | Facility: CLINIC | Age: 43
End: 2023-02-23
Payer: COMMERCIAL

## 2023-02-23 ENCOUNTER — OFFICE VISIT (OUTPATIENT)
Dept: INTERNAL MEDICINE | Facility: CLINIC | Age: 43
End: 2023-02-23
Payer: COMMERCIAL

## 2023-02-23 VITALS
DIASTOLIC BLOOD PRESSURE: 80 MMHG | TEMPERATURE: 98.4 F | RESPIRATION RATE: 20 BRPM | SYSTOLIC BLOOD PRESSURE: 118 MMHG | OXYGEN SATURATION: 95 % | BODY MASS INDEX: 31.59 KG/M2 | WEIGHT: 226.5 LBS | HEART RATE: 99 BPM

## 2023-02-23 DIAGNOSIS — J40 BRONCHITIS: ICD-10-CM

## 2023-02-23 DIAGNOSIS — R05.3 CHRONIC COUGH: Primary | ICD-10-CM

## 2023-02-23 DIAGNOSIS — E78.5 DYSLIPIDEMIA: ICD-10-CM

## 2023-02-23 DIAGNOSIS — R09.89 LABILE HYPERTENSION: Primary | ICD-10-CM

## 2023-02-23 PROCEDURE — 99214 OFFICE O/P EST MOD 30 MIN: CPT | Performed by: STUDENT IN AN ORGANIZED HEALTH CARE EDUCATION/TRAINING PROGRAM

## 2023-02-23 RX ORDER — METHYLPREDNISOLONE 4 MG/1
TABLET ORAL
Qty: 21 TABLET | Refills: 0 | Status: SHIPPED | OUTPATIENT
Start: 2023-02-23 | End: 2023-03-13

## 2023-02-23 NOTE — TELEPHONE ENCOUNTER
CMP and lipid panel ordered. Patient informed of need to have those performed before cardiology appointment on Wednesday, 3/1/23.

## 2023-02-23 NOTE — PROGRESS NOTES
Follow Up Office Visit      Date: 2023   Patient Name: Julian Willoughby  : 1980   MRN: [id number]     Chief Complaint:    Chief Complaint   Patient presents with   • Cough       History of Present Illness: Julian Willoughby is a 42 y.o. male who is here today for chronic cough.    HPI    Cough  He complains of a cough for approximately 4 weeks. He was seen in the office 2 weeks ago and prescribed Flonase. He did not notice a change when using Flonase. He finished his round of antibiotics, but his cough is still persistent. He complains of chest tightness and soreness as well as wheezing. He also had mild diarrhea. He reports waking up at 2:00 AM with acid reflux. He drank some water and eventually he vomited twice. He was unable to go back to sleep. He had cold and numb bilateral hands which improved later that morning. He took Luz aspirin earlier that day due to inflammation in his nose. He notes having occasional acid reflux in the past due to eating late at night. He denies taking any medication for acid reflux. He thinks he could have food poisoning from eating 5 day old macaroni and cheese. He has been given clindamycin and a steroid pack in the past for cough which helped. His cough went away within 1 hour of taking the steroids. He denies having asthma. He saw his allergist, Dr. Marquez, but he was unable to do anything because his previous records were not sent from Dr. Mack's office. He has not had allergy testing in 3 years. He was not able to have a CT scan because he has not met his deductible. His symptoms are similar to when he was diagnosed with pneumonia in 2021. He saw his allergist at that time, and they thought it was bronchitis. His follow-up chest x-ray was normal. He has taken Tessalon Perles and cough medication in the past which did not help.       Endorses doing PFT's in past with allergist that were reportedly normal.  Subjective      Review of Systems:    Review of Systems    I have reviewed the patients family history, social history, past medical history, past surgical history and have updated it as appropriate.     Medications:     Current Outpatient Medications:   •  amLODIPine-valsartan (EXFORGE) 5-160 MG per tablet, Take 1 tablet by mouth Daily., Disp: 30 tablet, Rfl: 11  •  cetirizine (zyrTEC) 10 MG tablet, Take 10 mg by mouth Daily., Disp: , Rfl:   •  fluticasone (FLONASE) 50 MCG/ACT nasal spray, 1 spray into the nostril(s) as directed by provider Daily., Disp: 16 g, Rfl: 3  •  Hydrocortisone Ace-Pramoxine 1-1 % rectal cream, As Needed., Disp: , Rfl:   •  ketoconazole (NIZORAL) 2 % cream, APPLY TOPICALLY TO THE AFFECTED AREA AT BEDTIME, Disp: , Rfl:   •  Multiple Vitamin (MULTI-VITAMIN DAILY PO), Take  by mouth As Needed., Disp: , Rfl:   •  Omega-3 Fatty Acids (fish oil) 1000 MG capsule capsule, Take 1,000 mg by mouth Daily., Disp: , Rfl:   •  Red Yeast Rice Extract 600 MG capsule, Take 1,200 mg by mouth Daily., Disp: , Rfl:   •  methylPREDNISolone (MEDROL) 4 MG dose pack, Take as directed on package instructions., Disp: 21 tablet, Rfl: 0    Allergies:   Allergies   Allergen Reactions   • Claritin [Loratadine] Other (See Comments)     Depressed     • Sudafed [Pseudoephedrine Hcl] Other (See Comments)     depressed   • Sulfa Antibiotics GI Intolerance   • Tobramycin Unknown (See Comments)       Objective     Physical Exam: Please see above  Vital Signs:   Vitals:    02/23/23 1414   BP: 118/80   BP Location: Left arm   Patient Position: Sitting   Cuff Size: Adult   Pulse: 99   Resp: 20   Temp: 98.4 °F (36.9 °C)   TempSrc: Temporal   SpO2: 95%   Weight: 103 kg (226 lb 8 oz)   PainSc:   2   PainLoc: Abdomen     Body mass index is 31.59 kg/m².    Physical Exam  Vitals reviewed.   Constitutional:       General: He is not in acute distress.     Appearance: Normal appearance. He is obese. He is not ill-appearing or toxic-appearing.   HENT:      Head: Normocephalic  and atraumatic.      Right Ear: External ear normal.      Left Ear: External ear normal.      Nose: No congestion.      Mouth/Throat:      Mouth: Mucous membranes are moist.      Pharynx: No oropharyngeal exudate or posterior oropharyngeal erythema.      Comments: Post nasal drip  Eyes:      General: No scleral icterus.        Right eye: No discharge.         Left eye: No discharge.      Extraocular Movements: Extraocular movements intact.      Pupils: Pupils are equal, round, and reactive to light.   Cardiovascular:      Rate and Rhythm: Normal rate and regular rhythm.      Pulses: Normal pulses.      Heart sounds: Normal heart sounds.   Pulmonary:      Effort: Pulmonary effort is normal. No respiratory distress.      Breath sounds: Normal breath sounds. No stridor. No wheezing, rhonchi or rales.      Comments: Intermittent dry cough, similar to prior  Abdominal:      General: Abdomen is flat. There is no distension.   Musculoskeletal:         General: No swelling or deformity. Normal range of motion.      Cervical back: Normal range of motion. No rigidity.   Skin:     General: Skin is warm and dry.      Capillary Refill: Capillary refill takes less than 2 seconds.      Coloration: Skin is not jaundiced or pale.   Neurological:      General: No focal deficit present.      Mental Status: He is alert and oriented to person, place, and time. Mental status is at baseline.   Psychiatric:         Mood and Affect: Mood normal.         Behavior: Behavior normal.         Judgment: Judgment normal.         Procedures    Results:   Labs:   TSH   Date Value Ref Range Status   11/27/2020 2.100 0.270 - 4.200 uIU/mL Final        Imaging:   No valid procedures specified.     Assessment / Plan      Assessment/Plan:   Problem List Items Addressed This Visit        Pulmonary and Pneumonias    Chronic cough - Primary    Overview     Chest x-ray obtained.   Medrol Dosepak will be prescribed.            Current Assessment & Plan      Continues despite optimization of allergy control. Possible this is a post viral cough, bronchitis, GERD induced cough. Will obtain CXR and trial course of steroids for improvement as patient had improvement with similar symptoms 2 years ago. If symptoms not improved at next visit will empirically treat with PPI for gerd. Awaiting PFT's results requested from allergist.         Relevant Orders    XR Chest PA & Lateral   Other Visit Diagnoses     Bronchitis        Relevant Medications    methylPREDNISolone (MEDROL) 4 MG dose pack            Follow Up:   Return in about 4 weeks (around 3/23/2023) for Recheck chronic cough.      Lucio Mesa MD  [facility]    Transcribed from ambient dictation for Lucio Mesa MD by Madhu Capellan.  02/23/23   15:45 EST    Patient or patient representative verbalized consent to the visit recording.  I have personally performed the services described in this document as transcribed by the above individual, and it is both accurate and complete.

## 2023-02-24 PROBLEM — R05.3 CHRONIC COUGH: Status: ACTIVE | Noted: 2023-02-24

## 2023-02-24 NOTE — ASSESSMENT & PLAN NOTE
Continues despite optimization of allergy control. Possible this is a post viral cough, bronchitis, GERD induced cough. Will obtain CXR and trial course of steroids for improvement as patient had improvement with similar symptoms 2 years ago. If symptoms not improved at next visit will empirically treat with PPI for gerd. Awaiting PFT's results requested from allergist.

## 2023-02-28 ENCOUNTER — TELEPHONE (OUTPATIENT)
Dept: INTERNAL MEDICINE | Facility: CLINIC | Age: 43
End: 2023-02-28
Payer: COMMERCIAL

## 2023-02-28 NOTE — TELEPHONE ENCOUNTER
Called patient to discuss recent chest x-ray results.  Still awaiting final interpretation from [facility].  Staff calling to have them fax official interpretation.  I discussed with patient that I personally reviewed these plain films and did not note any acute cardiopulmonary abnormalities.  Patient states that his symptoms have somewhat improved with the Medrol Dosepak.  He is inquiring whether or not Singulair would be beneficial.  I discussed with patient that we could consider adding this at the next visit if his symptoms persist despite treatment with steroid course.  Patient voiced understanding.  All questions were answered.    Dr. Mesa

## 2023-02-28 NOTE — TELEPHONE ENCOUNTER
Caller: Julian Willoughby    Relationship: Self    Best call back number: [phone]    What is the best time to reach you: ANY    Who are you requesting to speak with (clinical staff, provider,  specific staff member): CLINICAL    Do you know the name of the person who called: SELF    What was the call regarding: PATIENT WANTS TO DISCUSS X-RAY RESULTS.    Do you require a callback: YES

## 2023-03-11 ENCOUNTER — LAB (OUTPATIENT)
Dept: LAB | Facility: HOSPITAL | Age: 43
End: 2023-03-11
Payer: COMMERCIAL

## 2023-03-11 DIAGNOSIS — E78.5 DYSLIPIDEMIA: ICD-10-CM

## 2023-03-11 DIAGNOSIS — R09.89 LABILE HYPERTENSION: ICD-10-CM

## 2023-03-11 LAB
ALBUMIN SERPL-MCNC: 4.2 G/DL (ref 3.5–5.2)
ALBUMIN/GLOB SERPL: 1.3 G/DL
ALP SERPL-CCNC: 69 U/L (ref 39–117)
ALT SERPL W P-5'-P-CCNC: 39 U/L (ref 1–41)
ANION GAP SERPL CALCULATED.3IONS-SCNC: 11 MMOL/L (ref 5–15)
AST SERPL-CCNC: 22 U/L (ref 1–40)
BILIRUB SERPL-MCNC: 0.5 MG/DL (ref 0–1.2)
BUN SERPL-MCNC: 17 MG/DL (ref 6–20)
BUN/CREAT SERPL: 17 (ref 7–25)
CALCIUM SPEC-SCNC: 8.9 MG/DL (ref 8.6–10.5)
CHLORIDE SERPL-SCNC: 106 MMOL/L (ref 98–107)
CHOLEST SERPL-MCNC: 181 MG/DL (ref 0–200)
CO2 SERPL-SCNC: 23 MMOL/L (ref 22–29)
CREAT SERPL-MCNC: 1 MG/DL (ref 0.76–1.27)
EGFRCR SERPLBLD CKD-EPI 2021: 95.8 ML/MIN/1.73
GLOBULIN UR ELPH-MCNC: 3.2 GM/DL
GLUCOSE SERPL-MCNC: 95 MG/DL (ref 65–99)
HDLC SERPL-MCNC: 42 MG/DL (ref 40–60)
LDLC SERPL CALC-MCNC: 114 MG/DL (ref 0–100)
LDLC/HDLC SERPL: 2.65 {RATIO}
POTASSIUM SERPL-SCNC: 4.5 MMOL/L (ref 3.5–5.2)
PROT SERPL-MCNC: 7.4 G/DL (ref 6–8.5)
SODIUM SERPL-SCNC: 140 MMOL/L (ref 136–145)
TRIGL SERPL-MCNC: 139 MG/DL (ref 0–150)
VLDLC SERPL-MCNC: 25 MG/DL (ref 5–40)

## 2023-03-11 PROCEDURE — 80053 COMPREHEN METABOLIC PANEL: CPT

## 2023-03-11 PROCEDURE — 36415 COLL VENOUS BLD VENIPUNCTURE: CPT

## 2023-03-11 PROCEDURE — 80061 LIPID PANEL: CPT

## 2023-03-13 ENCOUNTER — OFFICE VISIT (OUTPATIENT)
Dept: CARDIOLOGY | Facility: CLINIC | Age: 43
End: 2023-03-13
Payer: COMMERCIAL

## 2023-03-13 VITALS
SYSTOLIC BLOOD PRESSURE: 120 MMHG | DIASTOLIC BLOOD PRESSURE: 84 MMHG | BODY MASS INDEX: 31.08 KG/M2 | HEIGHT: 71 IN | WEIGHT: 222 LBS | OXYGEN SATURATION: 97 % | HEART RATE: 85 BPM

## 2023-03-13 DIAGNOSIS — E78.2 MIXED HYPERLIPIDEMIA: Primary | ICD-10-CM

## 2023-03-13 PROCEDURE — 93000 ELECTROCARDIOGRAM COMPLETE: CPT | Performed by: INTERNAL MEDICINE

## 2023-03-13 PROCEDURE — 99213 OFFICE O/P EST LOW 20 MIN: CPT | Performed by: INTERNAL MEDICINE

## 2023-03-13 RX ORDER — AMLODIPINE AND VALSARTAN 5; 160 MG/1; MG/1
1 TABLET ORAL DAILY
Qty: 90 TABLET | Refills: 3 | Status: SHIPPED | OUTPATIENT
Start: 2023-03-13

## 2023-03-13 NOTE — PROGRESS NOTES
Subjective:     Encounter Date: 03/13/23     Patient ID: Julian Willoughby is a 42 y.o. 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     REMOTE PHYSICIAN: Maribel Syed MD  INTERNIST: Lucio Mesa MD  ALLERGIST: Lala Jeong MD    Chief Complaint:   Chief Complaint   Patient presents with   • Labile hypertension     Problem List:  1. Elevated labile blood pressure readings without definitive diagnosis of hypertension  a. Residual class I symptoms and normal EKG, October 2020   b. abnormal 24-hour ambulatory blood pressure monitor and acceptable serum catecholamine results as well as aldosterone/ PRA ratio result with normal nocturnal oximetry screening study, autumn 2020  c. Residual class I symptoms, August 2022  2. Hyperlipidemia  3. Allergic rhinitis; gets routine allergy shots  4. Mild obesity: Body mass index is 30.98 kg/m².   5. Surgical history:  a. Right tear duct repair  b. [place] teeth extraction    Allergies   Allergen Reactions   • Claritin [Loratadine] Other (See Comments)     Depressed     • Sudafed [Pseudoephedrine Hcl] Other (See Comments)     depressed   • Sulfa Antibiotics GI Intolerance   • Tobramycin Unknown (See Comments)       Current Outpatient Medications:   •  amLODIPine-valsartan (EXFORGE) 5-160 MG per tablet, Take 1 tablet by mouth Daily., Disp: 30 tablet, Rfl: 11  •  cetirizine (zyrTEC) 10 MG tablet, Take 10 mg by mouth Daily., Disp: , Rfl:   •  fluticasone (FLONASE) 50 MCG/ACT nasal spray, 1 spray into the nostril(s) as directed by provider Daily., Disp: 16 g, Rfl: 3  •  Hydrocortisone Ace-Pramoxine 1-1 % rectal cream, As Needed., Disp: , Rfl:   •  ketoconazole (NIZORAL) 2 % cream, APPLY TOPICALLY TO THE AFFECTED AREA AT BEDTIME, Disp: , Rfl:   •  methylPREDNISolone  (MEDROL) 4 MG dose pack, Take as directed on package instructions., Disp: 21 tablet, Rfl: 0  •  Multiple Vitamin (MULTI-VITAMIN DAILY PO), Take  by mouth As Needed., Disp: , Rfl:   •  Omega-3 Fatty Acids (fish oil) 1000 MG capsule capsule, Take 1,000 mg by mouth Daily., Disp: , Rfl:   •  Red Yeast Rice Extract 600 MG capsule, Take 1,200 mg by mouth Daily., Disp: , Rfl:      History of Present Illness: Julian Willoughby returns to today for routine follow-up of hypertension and hyperlipidemia.  He was last seen by Dr. Foster in August of last year.  He did have an episode last month of what felt like a sinus infection followed by prolonged dry cough.  He had undergone chest x-ray at [facility] which showed some nonspecific haziness which could have been attributed to viral or atypical infection as well as ILD.  He was started on a brief course of steroids and his cough has since resolved.  He has not yet returned to his previous level of physical activity.    He has fairly recently started over-the-counter supplements with omega 3 and red yeast rice.  Since starting these he reports that his blood pressure seems to have been better controlled than previously.  He continues to watch his diet, avoiding red meat and fast food but would be further interested in speaking to a nutritionist about some of his elevated cholesterol.    Review of Systems   Constitutional: Negative for decreased appetite.   Cardiovascular: Negative for chest pain, dyspnea on exertion, leg swelling and palpitations.   Respiratory: Positive for cough and wheezing.           ECG 12 Lead    Date/Time: 3/13/2023 2:56 PM  Performed by: Kendrick Cazares MD  Authorized by: Kendrick Cazares MD   Comparison: compared with previous ECG from 4/18/2018  Similar to previous ECG  Rhythm: sinus rhythm  Rate: normal  BPM: 85  Conduction: conduction normal  ST Segments: ST segments normal  T Waves: T waves normal  QRS axis: normal  Other: no other  "findings    Clinical impression: normal ECG               Objective:       Vitals:    03/13/23 1407   BP: 120/84   BP Location: Right arm   Patient Position: Sitting   Pulse: 85   SpO2: 97%   Weight: 101 kg (222 lb)   Height: 180.3 cm (70.98\")   Body mass index is 30.98 kg/m².   Gen: well developed, sitting up on exam table comfortable appearing  HEENT: MMM, sclera anicteric, conjunctiva normal, no carotid bruits  CV: regular rate, regular rhythm, no murmurs or rubs, normal S1, S2. 2+ radial and DP pulses  Pulm: RA, normal work of breathing, no wheezes, rales, rhonchi  Ext: normal bulk for age, normal tone, no dependent edema  Neuro: alert, oriented, face symmetrical, moving all extremities well  Psych: normal mood, appropriate affect     Lab Review:     Lab Results   Component Value Date    GLUCOSE 95 03/11/2023    BUN 17 03/11/2023    CREATININE 1.00 03/11/2023    BCR 17.0 03/11/2023     03/11/2023    K 4.5 03/11/2023     03/11/2023    CO2 23.0 03/11/2023    CALCIUM 8.9 03/11/2023    ALBUMIN 4.2 03/11/2023    AST 22 03/11/2023    ALT 39 03/11/2023     Lab Results   Component Value Date    WBC 5.52 04/09/2022    HGB 17.7 04/09/2022    HCT 51.4 (H) 04/09/2022    MCV 86.5 04/09/2022     04/09/2022     Lab Results   Component Value Date    CHOL 181 03/11/2023    TRIG 139 03/11/2023    HDL 42 03/11/2023     (H) 03/11/2023         Assessment:       Overall continued acceptable course with no new interim cardiac complaints with good functional status. We will defer additional diagnostic or therapeutic intervention from a cardiac perspective at this time.    Diagnosis Plan   Labile hypertension  Excellent control today, continue current regimen. Can consider titration down if BP continues to improve with lifestyle management.     Dyslipidemia  Suboptimal control of LDL but low 10 year ASCVD risk based on overall risk factors. Continue current treatment, including diet, exercise.   Will refer to " nutrition services at patient request          Plan:       1. Patient to continue current medications and close follow up with the above providers.  2. Return in about 8 months (around 11/13/2023).     AMY Cazares MD  03/12/23 21:26 EDT

## 2023-03-27 ENCOUNTER — OFFICE VISIT (OUTPATIENT)
Dept: INTERNAL MEDICINE | Facility: CLINIC | Age: 43
End: 2023-03-27
Payer: COMMERCIAL

## 2023-03-27 VITALS
TEMPERATURE: 97.8 F | HEART RATE: 85 BPM | BODY MASS INDEX: 31.17 KG/M2 | RESPIRATION RATE: 20 BRPM | SYSTOLIC BLOOD PRESSURE: 128 MMHG | OXYGEN SATURATION: 97 % | WEIGHT: 223.38 LBS | DIASTOLIC BLOOD PRESSURE: 80 MMHG

## 2023-03-27 DIAGNOSIS — R05.3 CHRONIC COUGH: Primary | ICD-10-CM

## 2023-03-27 DIAGNOSIS — J30.9 ALLERGIC RHINITIS, UNSPECIFIED SEASONALITY, UNSPECIFIED TRIGGER: ICD-10-CM

## 2023-03-27 PROCEDURE — 99214 OFFICE O/P EST MOD 30 MIN: CPT | Performed by: STUDENT IN AN ORGANIZED HEALTH CARE EDUCATION/TRAINING PROGRAM

## 2023-03-27 RX ORDER — MONTELUKAST SODIUM 10 MG/1
10 TABLET ORAL NIGHTLY
Qty: 90 TABLET | Refills: 0 | Status: SHIPPED | OUTPATIENT
Start: 2023-03-27

## 2023-03-27 NOTE — PROGRESS NOTES
"    Follow Up Office Visit      Date: 2023   Patient Name: Julian Willoughby  : 1980   MRN: [id number]     Chief Complaint:    Chief Complaint   Patient presents with   • Follow-up     4 week follow up        History of Present Illness: Julian Willoughby is a 42 y.o. male who is here today to follow up with chronic cough.    HPI   Cough  \"Continues despite optimization of allergy control. Possible this is a post viral cough, bronchitis, GERD induced cough. Will obtain CXR and trial course of steroids for improvement as patient had improvement with similar symptoms 2 years ago. If symptoms not improved at next visit will empirically treat with PPI for gerd. Awaiting PFT's results requested from allergist.\"    Reviewed note from allergist dated    Chronic cough  The patient previously experienced a dry cough for weeks. The onset of his symptoms started in late 2023. He had a recent chest x-ray that reported a reticular pattern and granular opacities of uncertain chronicity. Occasionally, he still has a cough, but it has significantly improved. He reports coughing once every 2 to 3 hours. He denies any current dyspnea or chest pain. His SpO2 is 97 % today. He saw his allergist, Dr. Marquez, in 2023 for a breathing test that showed normal spirometry. He reports having similar symptoms in 2021 or 2021, and his chest x-ray showed some fluid. His allergist at that time Dr. Mack, thought it was bronchitis. He had a follow-up chest x-ray that was normal.     Allergic rhinitis  He reports that he was successful in getting his medical records from [facility] sent to his allergists. Occasionally, he has sneezing fits in the evenings. He uses saline to clear his sinuses out. He is unsure what triggers his sneezing fits. He takes Zyrtec 10 mg daily, Flonase, and supplements. He has a history of sinus infections with inflammation and pain. He would take antibiotics, and his symptoms would improve " within a few days. He took Keflex with his last infection but denies noticing a change. A previous allergy test reported a reaction to mold.       Subjective      Review of Systems:   Review of Systems    I have reviewed the patients family history, social history, past medical history, past surgical history and have updated it as appropriate.     Medications:     Current Outpatient Medications:   •  amLODIPine-valsartan (EXFORGE) 5-160 MG per tablet, Take 1 tablet by mouth Daily., Disp: 90 tablet, Rfl: 3  •  cetirizine (zyrTEC) 10 MG tablet, Take 1 tablet by mouth Daily., Disp: , Rfl:   •  fluticasone (FLONASE) 50 MCG/ACT nasal spray, 1 spray into the nostril(s) as directed by provider Daily., Disp: 16 g, Rfl: 3  •  Hydrocortisone Ace-Pramoxine 1-1 % rectal cream, As Needed., Disp: , Rfl:   •  ketoconazole (NIZORAL) 2 % cream, APPLY TOPICALLY TO THE AFFECTED AREA AT BEDTIME, Disp: , Rfl:   •  Multiple Vitamin (MULTI-VITAMIN DAILY PO), Take  by mouth As Needed., Disp: , Rfl:   •  Omega-3 Fatty Acids (fish oil) 1000 MG capsule capsule, Take 1 capsule by mouth Daily., Disp: , Rfl:   •  Red Yeast Rice Extract 600 MG capsule, Take 1,200 mg by mouth Daily., Disp: , Rfl:     Allergies:   Allergies   Allergen Reactions   • Claritin [Loratadine] Other (See Comments)     Depressed     • Sudafed [Pseudoephedrine Hcl] Other (See Comments)     depressed   • Sulfa Antibiotics GI Intolerance   • Tobramycin Unknown (See Comments)       Objective     Physical Exam: Please see above  Vital Signs:   Vitals:    03/27/23 1453   BP: 128/80   BP Location: Right arm   Patient Position: Sitting   Cuff Size: Adult   Pulse: 85   Resp: 20   Temp: 97.8 °F (36.6 °C)   TempSrc: Temporal   SpO2: 97%   Weight: 101 kg (223 lb 6 oz)   PainSc: 0-No pain     Body mass index is 31.17 kg/m².         Physical Exam  Vitals reviewed.   Constitutional:       General: He is not in acute distress.     Appearance: Normal appearance. He is obese. He is not  ill-appearing or toxic-appearing.   HENT:      Head: Normocephalic and atraumatic.      Right Ear: External ear normal.      Left Ear: External ear normal.      Nose: Congestion present.      Mouth/Throat:      Mouth: Mucous membranes are moist.      Pharynx: No oropharyngeal exudate or posterior oropharyngeal erythema.      Comments: Post nasal drip  Eyes:      General: No scleral icterus.        Right eye: No discharge.         Left eye: No discharge.      Extraocular Movements: Extraocular movements intact.      Pupils: Pupils are equal, round, and reactive to light.   Cardiovascular:      Rate and Rhythm: Normal rate and regular rhythm.      Pulses: Normal pulses.      Heart sounds: Normal heart sounds.   Pulmonary:      Effort: Pulmonary effort is normal. No respiratory distress.      Breath sounds: Normal breath sounds. No stridor. No wheezing, rhonchi or rales.      Comments: No cough on exam today  Abdominal:      General: Abdomen is flat. There is no distension.   Musculoskeletal:         General: No swelling or deformity. Normal range of motion.      Cervical back: Normal range of motion. No rigidity.   Skin:     General: Skin is warm and dry.      Capillary Refill: Capillary refill takes less than 2 seconds.      Coloration: Skin is not jaundiced or pale.   Neurological:      General: No focal deficit present.      Mental Status: He is alert and oriented to person, place, and time. Mental status is at baseline.   Psychiatric:         Mood and Affect: Mood normal.         Behavior: Behavior normal.         Judgment: Judgment normal.         Procedures    Results:   Labs:   TSH   Date Value Ref Range Status   11/27/2020 2.100 0.270 - 4.200 uIU/mL Final        Imaging:   No valid procedures specified.     Assessment / Plan      Assessment/Plan:   Problem List Items Addressed This Visit        Allergies and Adverse Reactions    Allergic rhinitis    Relevant Medications    montelukast (Singulair) 10 MG tablet        Pulmonary and Pneumonias    Chronic cough - Primary    Overview     CXR in 2/2023 with reticular opacities, not noted on prior cxr. Symptoms improved after medrol dose pack.           Current Assessment & Plan     Chronic, improving. Will optimize allergy care with addition of singulair. CXR had reticular opacities we could be secondary to viral illness or ILD. No crackles on exam making ILD less likely. Also had normal PFT's recently (2/2023 with allergy, scanned in media). Will obtain repeat CXR in 4-6 weeks to see if opacities have resolved. If persistent will obtain CT scan of chest and refer to pulmonology.         Relevant Orders    XR Chest PA & Lateral   I personally reviewed note dated 2/21/23 by Dr. Alejandro Marquez of [facility]. I reviewed the pulmonary function tests within this document as well. Scanned in media.     Follow Up:   Return in about 2 months (around 5/27/2023) for Recheck cough and allergies.    I spent 30 minutes caring for Julian on this date of service. This time includes time spent by me in the following activities: preparing for the visit, reviewing tests, performing a medically appropriate examination and/or evaluation, counseling and educating the patient/family/caregiver, ordering medications, tests, or procedures and documenting information in the medical record      Lucio Mesa MD   [facility] D[facility]W [facility]    Transcribed from ambient dictation for Lucio Mesa MD by Madhu Capellan.  03/27/23   15:58 EDT    Patient or patient representative verbalized consent to the visit recording.  I have personally performed the services described in this document as transcribed by the above individual, and it is both accurate and complete.

## 2023-03-27 NOTE — ASSESSMENT & PLAN NOTE
Chronic, improving. Will optimize allergy care with addition of singulair. CXR had reticular opacities we could be secondary to viral illness or ILD. No crackles on exam making ILD less likely. Also had normal PFT's recently (2/2023 with allergy, scanned in media). Will obtain repeat CXR in 4-6 weeks to see if opacities have resolved. If persistent will obtain CT scan of chest and refer to pulmonology.

## 2023-05-16 ENCOUNTER — TELEPHONE (OUTPATIENT)
Dept: INTERNAL MEDICINE | Facility: CLINIC | Age: 43
End: 2023-05-16
Payer: COMMERCIAL

## 2023-05-16 NOTE — TELEPHONE ENCOUNTER
"  Caller: Julian Willoughby \"Pacheco\"    Relationship: Self    Best call back number: [phone]    What is the best time to reach you: ANY    Who are you requesting to speak with (clinical staff, provider,  specific staff member): DR. CLAYTON'S NURSE    What was the call regarding: PATIENT HAS A QUESTION ABOUT HIS montelukast (Singulair) 10 MG tablet. CAN HE TAKE THIS IN THE MORNING? THE DIRECTIONS STATE TO TAKE THIS AT NIGHT.    Do you require a callback: YES      "

## 2023-05-22 ENCOUNTER — HOSPITAL ENCOUNTER (OUTPATIENT)
Dept: NUTRITION | Facility: HOSPITAL | Age: 43
Setting detail: RECURRING SERIES
Discharge: HOME | End: 2023-05-22

## 2023-05-22 PROCEDURE — 97802 MEDICAL NUTRITION INDIV IN: CPT

## 2023-05-22 NOTE — CONSULTS
[facility]          Initial 60 Minute Nutrition Visit    Date: 2023   Patient Name: Julian Willoughby  : 1980   MRN: [id number]   Referring Provider: Kendrick Cazares MD    Reason for Visit: hyperlipidemia   Visit Format: in office     Nutrition Assessment       Social History:   Social History     Socioeconomic History   • Marital status: Single   Tobacco Use   • Smoking status: Never   • Smokeless tobacco: Never   Vaping Use   • Vaping Use: Never used   Substance and Sexual Activity   • Alcohol use: No   • Drug use: No   • Sexual activity: Not Currently     Partners: Female     Birth control/protection: Condom     Active Problem List:   Patient Active Problem List    Diagnosis    • Chronic cough [R05.3]    • Allergic rhinitis [J30.9]    • Atopic dermatitis [L20.9]    • BMI 30.0-30.9,adult [Z68.30]    • Mild obesity [E66.9]    • Labile hypertension [R09.89]    • Dyslipidemia [E78.5]    • Transient elevated blood pressure [R03.0]    • Mixed hyperlipidemia [E78.2]       Current Medications:   Current Outpatient Medications:   •  amLODIPine-valsartan (EXFORGE) 5-160 MG per tablet, Take 1 tablet by mouth Daily., Disp: 90 tablet, Rfl: 3  •  cetirizine (zyrTEC) 10 MG tablet, Take 1 tablet by mouth Daily., Disp: , Rfl:   •  fluticasone (FLONASE) 50 MCG/ACT nasal spray, 1 spray into the nostril(s) as directed by provider Daily., Disp: 16 g, Rfl: 3  •  Hydrocortisone Ace-Pramoxine 1-1 % rectal cream, As Needed., Disp: , Rfl:   •  ketoconazole (NIZORAL) 2 % cream, APPLY TOPICALLY TO THE AFFECTED AREA AT BEDTIME, Disp: , Rfl:   •  montelukast (Singulair) 10 MG tablet, Take 1 tablet by mouth Every Night., Disp: 90 tablet, Rfl: 0  •  Multiple Vitamin (MULTI-VITAMIN DAILY PO), Take  by mouth As Needed., Disp: , Rfl:   •  Omega-3 Fatty Acids (fish oil) 1000 MG capsule capsule, Take 1 capsule by mouth Daily., Disp: , Rfl:   •  Red Yeast Rice Extract 600 MG capsule, Take 1,200 mg  "by mouth Daily., Disp: , Rfl:     Labs: LDL: 114 mg/dL (3/11/2023)    Hunger Vital Sign Food Insecurity Assessment:  Within the past 12 months I/we worried whether our food would run out before I/we got money to buy more: no   Within the past 12 months the food I/we bought just didn't last and I/we didn't have money to get more: no   Use of food assistance programs (WIC, food stamps, food jacobo) no       Food & Nutrition Related History       Food Allergies: NKFA  Details at home: not disclosed   Who prepares most meals: patient   Who does grocery shopping: patient   How many meals are purchased from fast food/sit down restaurants per week: 0-2  Difficulty chewing: no  Difficulty swallowing: no  Diet requirement related to personal preference or cultural belief: in general, a \"healthy\" diet    History of eating disorder/disordered eating habits: None  Language/communication details: english  Barriers to learning: No barriers identified at this time    24 Hour Recall:   Time Food/beverages consumed   Breakfast 2 pieces toast, 1 glass OJ       Lunch 1 pack of PB crackers        Dinner  [employer]: 1 piece turkey, 1 piece meatloaf, 1 serving potato salad, 1 serving pasta salad, 1 corn bread muffin, 1 piece chocolate cake                  Additional comments: patient reports this is not a typical day, weekends look different than weekdays at times.     Anthropometrics      Height:   Ht Readings from Last 1 Encounters:   03/13/23 180.3 cm (70.98\")     Weight:   Wt Readings from Last 3 Encounters:   03/27/23 101 kg (223 lb 6 oz)   03/13/23 101 kg (222 lb)   02/23/23 103 kg (226 lb 8 oz)     BMI: There is no height or weight on file to calculate BMI.   Weight Change: none disclosed     Physical Activity     Activity  Frequency Duration    Interval training 15/30/45 Every other day 90 minutes                                    Barriers to physical activity: none disclosed, plans to start implementing running as well.  "     Estimated Needs     Estimated Energy Needs: ASMITA x 1.3 -500= 2005 calories per day    Estimated Protein Needs: 0.8-1.0 g/kg= ~ g protein per day     Estimated Fluid Needs: ~2505 mL per day     Discussion / Education      Patient reports in office for his initial visit today. He was referred for hyperlipidemia, mixed. Patient total CHOL, TRIG, and HDL are WNL. Pt's LDL is slightly elevated at 114 mg/dL. Patient reports that he has been attempting to make diet changes over the past few years. He watches his sodium intake and eats a lot of plant based/meat alternative options. Patient reports that he has been attempting to take healthy snacks to work. He reports that he has a sweet tooth, is hoping by increased satisfaction from meals will help cut down on his cravings and snacking between meals. We spoke about incorporating more fiber in patients diet, specifically soluble to assist with CHOL levels. Plan to attempt to meet 20g fiber daily. We also spoke about the AHA of <20% total fat intake coming from saturated fat daily which would be a goal of 13g or less daily. Patient plans to track these items in ruel. Patient is increasing activity. Spoke about recommendations of at least 150 min cardio weekly while also implementing good non-aerobic/resistance exercises. Patient plans to keep implementing exercises routinely. Will follow up in ~6 weeks.      Assessment of patient engagement: Asked appropriate questions, Engaged     Measurement of understanding: Patient verbalized understanding    Resources Provided: cholesterol lowering nutrition therapy, healthy snack ideas, high fiber foods list, meal planning guide     Goal (s)      Goal 1: Attempt to consume <20%  Total fat from saturated fat intake daily (~13 g or less daily)    Goal 2: Attempt to consume at least 20g fiber daily     Goal 3: increase fruit, vegetable, and water intake    Goal 4: implement non aerobic and aerobic exercise routinely     Plan of  Care     PES Statement:   Food and nutrition related to knowledge deficit related to no prior nutrition education as evidenced by mixed hyperlipidemia diagnosis .     Follow Up Visit      Follow Up:   7/13/2023 @ 2:45 PM    Total of 60 minutes spent with patient on nutrition counseling. Education based on Academy of Nutrition and Dietetics guidelines. Patient was provided with RD's contact information. Thank you for this referral.

## 2023-05-26 ENCOUNTER — TELEPHONE (OUTPATIENT)
Dept: INTERNAL MEDICINE | Facility: CLINIC | Age: 43
End: 2023-05-26
Payer: COMMERCIAL

## 2023-05-26 DIAGNOSIS — R93.89 ABNORMAL CHEST X-RAY: ICD-10-CM

## 2023-05-26 DIAGNOSIS — R05.3 CHRONIC COUGH: Primary | ICD-10-CM

## 2023-05-26 NOTE — TELEPHONE ENCOUNTER
Please call patient and inform him that Xray has been ordered and Faxed to [facility].    Dr. Mesa

## 2023-05-26 NOTE — TELEPHONE ENCOUNTER
"    Caller: Julian Willoughby \"Pacheco\"    Relationship: Self    Best call back number: [phone]    What orders are you requesting (i.e. lab or imaging): FOLLOW UP CHEST XRAY    In what timeframe would the patient need to come in: TODAY IF POSSIBLE    Where will you receive your lab/imaging services: [facility] [address]    Additional notes: REQUESTING TO BE CALL IF AND WHEN THE ORDER CAN BE FAXED; HE IS OFF WORK TODAY          "

## 2023-06-01 ENCOUNTER — OFFICE VISIT (OUTPATIENT)
Dept: INTERNAL MEDICINE | Facility: CLINIC | Age: 43
End: 2023-06-01

## 2023-06-01 VITALS
DIASTOLIC BLOOD PRESSURE: 82 MMHG | WEIGHT: 225 LBS | TEMPERATURE: 98.2 F | BODY MASS INDEX: 31.4 KG/M2 | HEART RATE: 86 BPM | RESPIRATION RATE: 20 BRPM | SYSTOLIC BLOOD PRESSURE: 120 MMHG

## 2023-06-01 DIAGNOSIS — H01.002 BLEPHARITIS OF RIGHT LOWER EYELID, UNSPECIFIED TYPE: ICD-10-CM

## 2023-06-01 DIAGNOSIS — R05.3 CHRONIC COUGH: Primary | ICD-10-CM

## 2023-06-01 DIAGNOSIS — H10.13 ALLERGIC CONJUNCTIVITIS OF BOTH EYES: ICD-10-CM

## 2023-06-01 DIAGNOSIS — J30.9 ALLERGIC RHINITIS, UNSPECIFIED SEASONALITY, UNSPECIFIED TRIGGER: ICD-10-CM

## 2023-06-01 DIAGNOSIS — H53.9 VISION CHANGES: ICD-10-CM

## 2023-06-01 PROCEDURE — 99214 OFFICE O/P EST MOD 30 MIN: CPT | Performed by: STUDENT IN AN ORGANIZED HEALTH CARE EDUCATION/TRAINING PROGRAM

## 2023-06-01 RX ORDER — OLOPATADINE HYDROCHLORIDE 2 MG/ML
1 SOLUTION/ DROPS OPHTHALMIC DAILY
Qty: 2.5 ML | Refills: 3 | Status: SHIPPED | OUTPATIENT
Start: 2023-06-01

## 2023-06-01 RX ORDER — ERYTHROMYCIN 5 MG/G
OINTMENT OPHTHALMIC EVERY 6 HOURS
Qty: 3.5 G | Refills: 0 | Status: SHIPPED | OUTPATIENT
Start: 2023-06-01 | End: 2023-06-08

## 2023-06-01 RX ORDER — MONTELUKAST SODIUM 10 MG/1
10 TABLET ORAL NIGHTLY
Qty: 90 TABLET | Refills: 1 | Status: SHIPPED | OUTPATIENT
Start: 2023-06-01

## 2023-06-01 NOTE — PROGRESS NOTES
"    Follow Up Office Visit      Date: 2023   Patient Name: Julian Willoughby  : 1980   MRN: [id number]     Chief Complaint:    Chief Complaint   Patient presents with   • Follow-up     2 month        History of Present Illness: Julian Willoughby is a 42 y.o. male who is here today to follow up with the following.    The patient is here today to follow up with the following. He has agreed to use [facility] for today's visit.     The patient reports an potential conjunctivitis on his left eye. He states in the past, he was provided with erythromycin ointment, which works well for him.     The patient's cough has resolved. He notes he is not having side effects from montelukast, but is unsure if it is helping.     The patient notes his vision may need to be evaluated. He is unsure if his visual change is correlated from eye strain.     Chronic cough   From last note: \"The onset of his symptoms started in late 2023. He had a recent chest x-ray that reported a reticular pattern and granular opacities of uncertain chronicity. Occasionally, he still has a cough, but it has significantly improved. He reports coughing once every 2 to 3 hours. He denies any current dyspnea or chest pain. His SpO2 is 97 % today. He saw his allergist, Dr. Marquez, in 2023 for a breathing test that showed normal spirometry.\"      Subjective      Review of Systems:   Review of Systems   Eyes: Positive for redness and visual disturbance.   Respiratory: Negative for cough.        I have reviewed the patients family history, social history, past medical history, past surgical history and have updated it as appropriate.     Medications:     Current Outpatient Medications:   •  amLODIPine-valsartan (EXFORGE) 5-160 MG per tablet, Take 1 tablet by mouth Daily., Disp: 90 tablet, Rfl: 3  •  cetirizine (zyrTEC) 10 MG tablet, Take 1 tablet by mouth Daily., Disp: , Rfl:   •  fluticasone (FLONASE) 50 MCG/ACT nasal spray, 1 spray into the " nostril(s) as directed by provider Daily., Disp: 16 g, Rfl: 3  •  Hydrocortisone Ace-Pramoxine 1-1 % rectal cream, As Needed., Disp: , Rfl:   •  ketoconazole (NIZORAL) 2 % cream, APPLY TOPICALLY TO THE AFFECTED AREA AT BEDTIME, Disp: , Rfl:   •  montelukast (Singulair) 10 MG tablet, Take 1 tablet by mouth Every Night., Disp: 90 tablet, Rfl: 1  •  Multiple Vitamin (MULTI-VITAMIN DAILY PO), Take  by mouth As Needed., Disp: , Rfl:   •  Omega-3 Fatty Acids (fish oil) 1000 MG capsule capsule, Take 1 capsule by mouth Daily., Disp: , Rfl:   •  Red Yeast Rice Extract 600 MG capsule, Take 1,200 mg by mouth Daily., Disp: , Rfl:   •  erythromycin (ROMYCIN) 5 MG/GM ophthalmic ointment, Administer  into the left eye Every 6 (Six) Hours for 7 days., Disp: 3.5 g, Rfl: 0    Allergies:   Allergies   Allergen Reactions   • Claritin [Loratadine] Other (See Comments)     Depressed     • Sudafed [Pseudoephedrine Hcl] Other (See Comments)     depressed   • Sulfa Antibiotics GI Intolerance   • Tobramycin Unknown (See Comments)       Objective     Physical Exam: Please see above  Vital Signs:   Vitals:    06/01/23 1328   BP: 120/82   BP Location: Left arm   Patient Position: Sitting   Cuff Size: Adult   Pulse: 86   Resp: 20   Temp: 98.2 °F (36.8 °C)   TempSrc: Temporal   Weight: 102 kg (225 lb)   PainSc: 0-No pain     Body mass index is 31.4 kg/m².  BMI is >= 30 and <35. (Class 1 Obesity). The following options were offered after discussion;: exercise counseling/recommendations and nutrition counseling/recommendations       Physical Exam  Vitals reviewed.   Constitutional:       General: He is not in acute distress.     Appearance: Normal appearance. He is obese. He is not ill-appearing or toxic-appearing.   HENT:      Head: Normocephalic and atraumatic.      Right Ear: External ear normal.      Left Ear: External ear normal.      Nose: No congestion.      Mouth/Throat:      Mouth: Mucous membranes are moist.      Pharynx: No oropharyngeal  exudate or posterior oropharyngeal erythema.      Comments: Post nasal drip  Eyes:      General: No scleral icterus.        Right eye: No discharge.         Left eye: No discharge.      Extraocular Movements: Extraocular movements intact.      Pupils: Pupils are equal, round, and reactive to light.      Comments: Left inferior eyelid with mild erythema near the follicles of the eyelashes. No lesions, ulcerations, or masses. EOM intact.   Cardiovascular:      Rate and Rhythm: Normal rate and regular rhythm.      Pulses: Normal pulses.      Heart sounds: Normal heart sounds.   Pulmonary:      Effort: Pulmonary effort is normal. No respiratory distress.      Breath sounds: Normal breath sounds. No stridor. No wheezing, rhonchi or rales.      Comments: No cough  Abdominal:      General: Abdomen is flat. There is no distension.   Musculoskeletal:         General: No swelling or deformity. Normal range of motion.      Cervical back: Normal range of motion. No rigidity.   Skin:     General: Skin is warm and dry.      Capillary Refill: Capillary refill takes less than 2 seconds.      Coloration: Skin is not jaundiced or pale.   Neurological:      General: No focal deficit present.      Mental Status: He is alert and oriented to person, place, and time. Mental status is at baseline.   Psychiatric:         Mood and Affect: Mood normal.         Behavior: Behavior normal.         Judgment: Judgment normal.         Procedures    Results:   Labs:   TSH   Date Value Ref Range Status   11/27/2020 2.100 0.270 - 4.200 uIU/mL Final        Imaging:   No valid procedures specified.     Assessment / Plan      Assessment/Plan:   Problem List Items Addressed This Visit        Allergies and Adverse Reactions    Allergic rhinitis    Current Assessment & Plan     Chronic, improving with treatment. Continue Singulair         Relevant Medications    montelukast (Singulair) 10 MG tablet       Eye    Vision changes    Current Assessment & Plan      Recommend patient be evaluated by optometry            Pulmonary and Pneumonias    Chronic cough - Primary    Overview     CXR in 2/2023 with reticular opacities, not noted on prior cxr. Symptoms improved after medrol dose pack.           Current Assessment & Plan     Resolved. Repeat CXR         Relevant Orders    XR Chest PA & Lateral   Other Visit Diagnoses     Blepharitis of right lower eyelid, unspecified type        Relevant Medications    erythromycin (ROMYCIN) 5 MG/GM ophthalmic ointment    Allergic conjunctivitis of both eyes        Relevant Medications    erythromycin (ROMYCIN) 5 MG/GM ophthalmic ointment    olopatadine (PATADAY) 0.2 % solution ophthalmic solution          - Patient was advised to take montelukast morning or night. He was advised of potential drowsiness associated with this medication. I will place an order for a chest x-ray to follow up interstitial changes on prior CXR, suspect this has likely resolved as cough is improved. The patient will receive medication refills.     Transcribed from ambient dictation for Lucio Mesa MD by Michelle Grimes.  06/01/23   15:10 EDT    Patient or patient representative verbalized consent to the visit recording.  I have personally performed the services described in this document as transcribed by the above individual, and it is both accurate and complete.      Follow Up:   Return if symptoms worsen or fail to improve.      Lucio Mesa MD   [facility]

## 2023-06-15 ENCOUNTER — TELEPHONE (OUTPATIENT)
Dept: INTERNAL MEDICINE | Facility: CLINIC | Age: 43
End: 2023-06-15
Payer: COMMERCIAL

## 2023-06-15 NOTE — TELEPHONE ENCOUNTER
----- Message from Lucio Mesa MD sent at 6/15/2023  1:39 PM EDT -----  Regarding: CXR 6/14/23  Please call [facility] at [phone] for interpretation of recent chest xray to be faxed to our office.    Thanks,  DR. Mesa

## 2023-06-16 ENCOUNTER — TELEPHONE (OUTPATIENT)
Dept: INTERNAL MEDICINE | Facility: CLINIC | Age: 43
End: 2023-06-16
Payer: COMMERCIAL

## 2023-06-16 DIAGNOSIS — R91.1 PULMONARY NODULE: Primary | ICD-10-CM

## 2023-06-16 DIAGNOSIS — R93.89 ABNORMAL CHEST X-RAY: ICD-10-CM

## 2023-06-16 NOTE — TELEPHONE ENCOUNTER
Spoke with patient and informed him of persistent interstitial lung changes on Xray and nodular airspace opacity, and my recommendation to obtain CT scan of the chest. Patient states cough is still improved, but has intermittent symptoms. Will order CT chest at this time. All questions answered.    DR. Mesa

## 2023-07-20 PROBLEM — K44.9 HIATAL HERNIA: Status: ACTIVE | Noted: 2023-07-20

## 2023-07-25 ENCOUNTER — OFFICE VISIT (OUTPATIENT)
Dept: INTERNAL MEDICINE | Facility: CLINIC | Age: 43
End: 2023-07-25
Payer: COMMERCIAL

## 2023-07-25 VITALS
TEMPERATURE: 98.4 F | HEART RATE: 70 BPM | DIASTOLIC BLOOD PRESSURE: 70 MMHG | WEIGHT: 220.13 LBS | BODY MASS INDEX: 30.72 KG/M2 | RESPIRATION RATE: 20 BRPM | SYSTOLIC BLOOD PRESSURE: 130 MMHG

## 2023-07-25 DIAGNOSIS — K21.9 GASTROESOPHAGEAL REFLUX DISEASE, UNSPECIFIED WHETHER ESOPHAGITIS PRESENT: ICD-10-CM

## 2023-07-25 DIAGNOSIS — K44.9 HIATAL HERNIA: Primary | ICD-10-CM

## 2023-07-25 PROCEDURE — 99214 OFFICE O/P EST MOD 30 MIN: CPT | Performed by: STUDENT IN AN ORGANIZED HEALTH CARE EDUCATION/TRAINING PROGRAM

## 2023-07-25 RX ORDER — FAMOTIDINE 20 MG/1
20 TABLET, FILM COATED ORAL DAILY
Qty: 30 TABLET | Refills: 2 | Status: SHIPPED | OUTPATIENT
Start: 2023-07-25 | End: 2023-10-23

## 2023-07-25 NOTE — PROGRESS NOTES
Follow Up Office Visit      Date: 2023   Patient Name: Julian Willoughby  : 1980   MRN: [id number]     Chief Complaint:    Chief Complaint   Patient presents with    Heartburn     Discuss GERD and treatment for acid reflux        History of Present Illness: Julian Willoughby is a 42 y.o. male who is here today to follow up with our clinic for GERD.    HPI    Medical history  He has a history of hyperlipidemia. He does not count calories. He is concerned about his hiatal hernia and acid reflux. He complains of a cough. He states his cough is exacerbated after eating. He states he does not eat late at night. He denies any difficulty swallowing. He has a history of elevated liver enzymes, which have normalized on recent labs.  Will have sour taste in mouth occasionally.  Subjective      Review of Systems:   Review of Systems   Constitutional:  Negative for activity change and appetite change.   HENT:  Negative for trouble swallowing.    Respiratory:  Positive for cough.    Gastrointestinal:  Positive for GERD and indigestion. Negative for blood in stool.     I have reviewed the patients family history, social history, past medical history, past surgical history and have updated it as appropriate.     Medications:     Current Outpatient Medications:     amLODIPine-valsartan (EXFORGE) 5-160 MG per tablet, Take 1 tablet by mouth Daily., Disp: 90 tablet, Rfl: 3    cetirizine (zyrTEC) 10 MG tablet, Take 1 tablet by mouth Daily., Disp: , Rfl:     fluticasone (FLONASE) 50 MCG/ACT nasal spray, 1 spray into the nostril(s) as directed by provider Daily., Disp: 16 g, Rfl: 3    Hydrocortisone Ace-Pramoxine 1-1 % rectal cream, As Needed., Disp: , Rfl:     ketoconazole (NIZORAL) 2 % cream, APPLY TOPICALLY TO THE AFFECTED AREA AT BEDTIME, Disp: , Rfl:     montelukast (SINGULAIR) 10 MG tablet, TAKE 1 TABLET EVERY NIGHT, Disp: 90 tablet, Rfl: 3    Multiple Vitamin (MULTI-VITAMIN DAILY PO), Take  by mouth As  Needed., Disp: , Rfl:     olopatadine (PATADAY) 0.2 % solution ophthalmic solution, Administer 1 drop to both eyes Daily., Disp: 2.5 mL, Rfl: 3    Omega-3 Fatty Acids (fish oil) 1000 MG capsule capsule, Take 1 capsule by mouth Daily., Disp: , Rfl:     Red Yeast Rice Extract 600 MG capsule, Take 1,200 mg by mouth Daily., Disp: , Rfl:     famotidine (Pepcid) 20 MG tablet, Take 1 tablet by mouth Daily for 90 days., Disp: 30 tablet, Rfl: 2    Allergies:   Allergies   Allergen Reactions    Claritin [Loratadine] Other (See Comments)     Depressed      Sudafed [Pseudoephedrine Hcl] Other (See Comments)     depressed    Sulfa Antibiotics GI Intolerance    Tobramycin Unknown (See Comments)       Objective     Physical Exam: Please see above  Vital Signs:   Vitals:    07/25/23 1422   BP: 130/70   BP Location: Right arm   Patient Position: Sitting   Cuff Size: Adult   Pulse: 70   Resp: 20   Temp: 98.4 °F (36.9 °C)   TempSrc: Temporal   Weight: 99.8 kg (220 lb 2 oz)   PainSc: 0-No pain     Body mass index is 30.72 kg/m².          Physical Exam  Vitals reviewed.   Constitutional:       General: He is not in acute distress.     Appearance: Normal appearance. He is obese. He is not ill-appearing or toxic-appearing.   HENT:      Head: Normocephalic and atraumatic.      Right Ear: External ear normal.      Left Ear: External ear normal.      Nose: No congestion.      Mouth/Throat:      Mouth: Mucous membranes are moist.      Pharynx: No oropharyngeal exudate or posterior oropharyngeal erythema.   Eyes:      General: No scleral icterus.        Right eye: No discharge.         Left eye: No discharge.      Extraocular Movements: Extraocular movements intact.      Pupils: Pupils are equal, round, and reactive to light.   Cardiovascular:      Rate and Rhythm: Normal rate and regular rhythm.      Pulses: Normal pulses.      Heart sounds: Normal heart sounds.   Pulmonary:      Effort: Pulmonary effort is normal. No respiratory distress.       Breath sounds: Normal breath sounds. No stridor. No wheezing, rhonchi or rales.      Comments: No cough  Abdominal:      General: Abdomen is flat. Bowel sounds are normal. There is no distension.      Palpations: Abdomen is soft.      Tenderness: There is no abdominal tenderness. There is no guarding.   Musculoskeletal:         General: No swelling or deformity. Normal range of motion.      Cervical back: Normal range of motion. No rigidity.   Skin:     General: Skin is warm and dry.      Capillary Refill: Capillary refill takes less than 2 seconds.      Coloration: Skin is not jaundiced or pale.   Neurological:      General: No focal deficit present.      Mental Status: He is alert and oriented to person, place, and time. Mental status is at baseline.   Psychiatric:         Mood and Affect: Mood normal.         Behavior: Behavior normal.         Judgment: Judgment normal.       Procedures    Results:   Labs:   TSH   Date Value Ref Range Status   11/27/2020 2.100 0.270 - 4.200 uIU/mL Final        Imaging:   No valid procedures specified.     Assessment / Plan      Assessment/Plan:   Problem List Items Addressed This Visit          Gastrointestinal Abdominal     Hiatal hernia - Primary    Relevant Medications    famotidine (Pepcid) 20 MG tablet     Other Visit Diagnoses       Gastroesophageal reflux disease, unspecified whether esophagitis present        Relevant Medications    famotidine (Pepcid) 20 MG tablet        GERD    He will be prescribed famotidine 20 mg tablets.   He will be provided with information on Pepcid and food to avoid.   He was encouraged to improve his diet and exercise, eat smaller meals, sit up right after eating a meal.   Avoid eating late in the afternoon, caffeine and alcohol. He was educated on his options such as seeing a gastroenterologist.     Follow Up:   Return if symptoms worsen or fail to improve.    I spent 30 minutes caring for Julian on this date of service. This time includes  time spent by me in the following activities: preparing for the visit, reviewing tests, performing a medically appropriate examination and/or evaluation, counseling and educating the patient/family/caregiver, ordering medications, tests, or procedures, and documenting information in the medical record      Lucio Mesa MD   [facility]  Transcribed from ambient dictation for Lucio Mesa MD by Tanisha Snell.  07/25/23   15:12 EDT    Patient or patient representative verbalized consent to the visit recording.  I have personally performed the services described in this document as transcribed by the above individual, and it is both accurate and complete.

## 2023-09-22 ENCOUNTER — OFFICE VISIT (OUTPATIENT)
Dept: INTERNAL MEDICINE | Facility: CLINIC | Age: 43
End: 2023-09-22

## 2023-09-22 VITALS
SYSTOLIC BLOOD PRESSURE: 128 MMHG | BODY MASS INDEX: 31.55 KG/M2 | HEIGHT: 70 IN | WEIGHT: 220.38 LBS | TEMPERATURE: 97.8 F | DIASTOLIC BLOOD PRESSURE: 80 MMHG | HEART RATE: 80 BPM | RESPIRATION RATE: 20 BRPM

## 2023-09-22 DIAGNOSIS — Z80.0 FAMILY HISTORY OF COLON CANCER IN MOTHER: ICD-10-CM

## 2023-09-22 DIAGNOSIS — R05.3 CHRONIC COUGH: ICD-10-CM

## 2023-09-22 DIAGNOSIS — E78.2 MIXED HYPERLIPIDEMIA: ICD-10-CM

## 2023-09-22 DIAGNOSIS — R09.89 LABILE HYPERTENSION: ICD-10-CM

## 2023-09-22 DIAGNOSIS — K44.9 HIATAL HERNIA: ICD-10-CM

## 2023-09-22 DIAGNOSIS — Z00.00 ROUTINE HEALTH MAINTENANCE: Primary | ICD-10-CM

## 2023-09-22 DIAGNOSIS — E66.9 OBESITY (BMI 30.0-34.9): ICD-10-CM

## 2023-09-22 NOTE — PATIENT INSTRUCTIONS
Health Maintenance, Male  A healthy lifestyle and preventive care is important for your health and wellness. Ask your health care provider about what schedule of regular examinations is right for you.  What should I know about weight and diet?    Eat a Healthy Diet  Eat plenty of vegetables, fruits, whole grains, low-fat dairy products, and lean protein.  Do not eat a lot of foods high in solid fats, added sugars, or salt.     Maintain a Healthy Weight  Regular exercise can help you achieve or maintain a healthy weight. You should:  Do at least 150 minutes of exercise each week. The exercise should increase your heart rate and make you sweat (moderate-intensity exercise).  Do strength-training exercises at least twice a week.     Watch Your Levels of Cholesterol and Blood Lipids  Have your blood tested for lipids and cholesterol every 5 years starting at 35 years of age. If you are at high risk for heart disease, you should start having your blood tested when you are 20 years old. You may need to have your cholesterol levels checked more often if:  Your lipid or cholesterol levels are high.  You are older than 50 years of age.  You are at high risk for heart disease.     What should I know about cancer screening?  Many types of cancers can be detected early and may often be prevented.  Lung Cancer  You should be screened every year for lung cancer if:  You are a current smoker who has smoked for at least 30 years.  You are a former smoker who has quit within the past 15 years.  Talk to your health care provider about your screening options, when you should start screening, and how often you should be screened.     Colorectal Cancer  Routine colorectal cancer screening usually begins at 50 years of age and should be repeated every 5-10 years until you are 75 years old. You may need to be screened more often if early forms of precancerous polyps or small growths are found. Your health care provider may recommend  screening at an earlier age if you have risk factors for colon cancer.  Your health care provider may recommend using home test kits to check for hidden blood in the stool.  A small camera at the end of a tube can be used to examine your colon (sigmoidoscopy or colonoscopy). This checks for the earliest forms of colorectal cancer.     Prostate and Testicular Cancer  Depending on your age and overall health, your health care provider may do certain tests to screen for prostate and testicular cancer.  Talk to your health care provider about any symptoms or concerns you have about testicular or prostate cancer.     Skin Cancer  Check your skin from head to toe regularly.  Tell your health care provider about any new moles or changes in moles, especially if:  There is a change in a mole’s size, shape, or color.  You have a mole that is larger than a pencil eraser.  Always use sunscreen. Apply sunscreen liberally and repeat throughout the day.  Protect yourself by wearing long sleeves, pants, a wide-brimmed hat, and sunglasses when outside.     What should I know about heart disease, diabetes, and high blood pressure?  If you are 18-39 years of age, have your blood pressure checked every 3-5 years. If you are 40 years of age or older, have your blood pressure checked every year. You should have your blood pressure measured twice--once when you are at a hospital or clinic, and once when you are not at a hospital or clinic. Record the average of the two measurements. To check your blood pressure when you are not at a hospital or clinic, you can use:  An automated blood pressure machine at a pharmacy.  A home blood pressure monitor.  Talk to your health care provider about your target blood pressure.  If you are between 45-79 years old, ask your health care provider if you should take aspirin to prevent heart disease.  Have regular diabetes screenings by checking your fasting blood sugar level.  If you are at a normal  weight and have a low risk for diabetes, have this test once every three years after the age of 45.  If you are overweight and have a high risk for diabetes, consider being tested at a younger age or more often.  A one-time screening for abdominal aortic aneurysm (AAA) by ultrasound is recommended for men aged 65-75 years who are current or former smokers.  What should I know about preventing infection?  Hepatitis B  If you have a higher risk for hepatitis B, you should be screened for this virus. Talk with your health care provider to find out if you are at risk for hepatitis B infection.  Hepatitis C  Blood testing is recommended for:  Everyone born from 1945 through 1965.  Anyone with known risk factors for hepatitis C.     Sexually Transmitted Diseases (STDs)  You should be screened each year for STDs including gonorrhea and chlamydia if:  You are sexually active and are younger than 24 years of age.  You are older than 24 years of age and your health care provider tells you that you are at risk for this type of infection.  Your sexual activity has changed since you were last screened and you are at an increased risk for chlamydia or gonorrhea. Ask your health care provider if you are at risk.  Talk with your health care provider about whether you are at high risk of being infected with HIV. Your health care provider may recommend a prescription medicine to help prevent HIV infection.     What else can I do?    Schedule regular health, dental, and eye exams.  Stay current with your vaccines (immunizations).  Do not use any tobacco products, such as cigarettes, chewing tobacco, and e-cigarettes. If you need help quitting, ask your health care provider.  Limit alcohol intake to no more than 2 drinks per day. One drink equals 12 ounces of beer, 5 ounces of wine, or 1½ ounces of hard liquor.  Do not use street drugs.  Do not share needles.  Ask your health care provider for help if you need support or information  about quitting drugs.  Tell your health care provider if you often feel depressed.  Tell your health care provider if you have ever been abused or do not feel safe at home.      This information is not intended to replace advice given to you by your health care provider. Make sure you discuss any questions you have with your health care provider.  Document Released: 06/15/2009 Document Revised: 08/16/2017 Document Reviewed: 09/20/2016  [employer] Patient Education © 2018 [employer]    Healthy Eating  Following a healthy eating pattern may help you to achieve and maintain a healthy body weight, reduce the risk of chronic disease, and live a long and productive life. It is important to follow a healthy eating pattern at an appropriate calorie level for your body. Your nutritional needs should be met primarily through food by choosing a variety of nutrient-rich foods.  What are tips for following this plan?  Reading food labels  Read labels and choose the following:  Reduced or low sodium.  Juices with 100% fruit juice.  Foods with low saturated fats and high polyunsaturated and monounsaturated fats.  Foods with whole grains, such as whole wheat, cracked wheat, brown rice, and wild rice.  Whole grains that are fortified with folic acid. This is recommended for women who are pregnant or who want to become pregnant.  Read labels and avoid the following:  Foods with a lot of added sugars. These include foods that contain brown sugar, corn sweetener, corn syrup, dextrose, fructose, glucose, high-fructose corn syrup, honey, invert sugar, lactose, malt syrup, maltose, molasses, raw sugar, sucrose, trehalose, or turbinado sugar.  Do not eat more than the following amounts of added sugar per day:  6 teaspoons (25 g) for women.  9 teaspoons (38 g) for men.  Foods that contain processed or refined starches and grains.  Refined grain products, such as white flour, degermed cornmeal, white bread, and white  "rice.  Shopping  Choose nutrient-rich snacks, such as vegetables, whole fruits, and nuts. Avoid high-calorie and high-sugar snacks, such as potato chips, fruit snacks, and candy.  Use oil-based dressings and spreads on foods instead of solid fats such as butter, stick margarine, or cream cheese.  Limit pre-made sauces, mixes, and \"instant\" products such as flavored rice, instant noodles, and ready-made pasta.  Try more plant-protein sources, such as tofu, tempeh, black beans, edamame, lentils, nuts, and seeds.  Explore eating plans such as the Mediterranean diet or vegetarian diet.  Cooking  Use oil to sauté or stir-abraham foods instead of solid fats such as butter, stick margarine, or lard.  Try baking, boiling, grilling, or broiling instead of frying.  Remove the fatty part of meats before cooking.  Steam vegetables in water or broth.  Meal planning    At meals, imagine dividing your plate into fourths:  One-half of your plate is fruits and vegetables.  One-fourth of your plate is whole grains.  One-fourth of your plate is protein, especially lean meats, poultry, eggs, tofu, beans, or nuts.  Include low-fat dairy as part of your daily diet.     Lifestyle  Choose healthy options in all settings, including home, work, school, restaurants, or stores.  Prepare your food safely:  Wash your hands after handling raw meats.  Keep food preparation surfaces clean by regularly washing with hot, soapy water.  Keep raw meats separate from ready-to-eat foods, such as fruits and vegetables.  , meat, poultry, and eggs to the recommended internal temperature.  Store foods at safe temperatures. In general:  Keep cold foods at 40°F (4.4°C) or below.  Keep hot foods at 140°F (60°C) or above.  Keep your freezer at 0°F (-17.8°C) or below.  Foods are no longer safe to eat when they have been between the temperatures of 40°-140°F (4.4-60°C) for more than 2 hours.  What foods should I eat?  Fruits  Aim to eat 2 cup-equivalents of " fresh, canned (in natural juice), or frozen fruits each day. Examples of 1 cup-equivalent of fruit include 1 small apple, 8 large strawberries, 1 cup canned fruit, ½ cup dried fruit, or 1 cup 100% juice.  Vegetables  Aim to eat 2½-3 cup-equivalents of fresh and frozen vegetables each day, including different varieties and colors. Examples of 1 cup-equivalent of vegetables include 2 medium carrots, 2 cups raw, leafy greens, 1 cup chopped vegetable (raw or cooked), or 1 medium baked potato.  Grains  Aim to eat 6 ounce-equivalents of whole grains each day. Examples of 1 ounce-equivalent of grains include 1 slice of bread, 1 cup ready-to-eat cereal, 3 cups popcorn, or ½ cup cooked rice, pasta, or cereal.  Meats and other proteins  Aim to eat 5-6 ounce-equivalents of protein each day. Examples of 1 ounce-equivalent of protein include 1 egg, 1/2 cup nuts or seeds, or 1 tablespoon (16 g) peanut butter. A cut of meat or fish that is the size of a deck of cards is about 3-4 ounce-equivalents.  Of the protein you eat each week, try to have at least 8 ounces come from seafood. This includes salmon, trout, herring, and anchovies.  Dairy  Aim to eat 3 cup-equivalents of fat-free or low-fat dairy each day. Examples of 1 cup-equivalent of dairy include 1 cup (240 mL) milk, 8 ounces (250 g) yogurt, 1½ ounces (44 g) natural cheese, or 1 cup (240 mL) fortified soy milk.  Fats and oils  Aim for about 5 teaspoons (21 g) per day. Choose monounsaturated fats, such as canola and olive oils, avocados, peanut butter, and most nuts, or polyunsaturated fats, such as sunflower, corn, and soybean oils, walnuts, pine nuts, sesame seeds, sunflower seeds, and flaxseed.  Beverages  Aim for six 8-oz glasses of water per day. Limit coffee to three to five 8-oz cups per day.  Limit caffeinated beverages that have added calories, such as soda and energy drinks.  Limit alcohol intake to no more than 1 drink a day for nonpregnant women and 2 drinks a day  for men. One drink equals 12 oz of beer (355 mL), 5 oz of wine (148 mL), or 1½ oz of hard liquor (44 mL).  Seasoning and other foods  Avoid adding excess amounts of salt to your foods. Try flavoring foods with herbs and spices instead of salt.  Avoid adding sugar to foods.  Try using oil-based dressings, sauces, and spreads instead of solid fats.  This information is based on general [place] nutrition guidelines. For more information, visit choosemyplate.gov. Exact amounts may vary based on your nutrition needs.  Summary  A healthy eating plan may help you to maintain a healthy weight, reduce the risk of chronic diseases, and stay active throughout your life.  Plan your meals. Make sure you eat the right portions of a variety of nutrient-rich foods.  Try baking, boiling, grilling, or broiling instead of frying.  Choose healthy options in all settings, including home, work, school, restaurants, or stores.  This information is not intended to replace advice given to you by your health care provider. Make sure you discuss any questions you have with your health care provider.  Document Revised: 04/01/2019 Document Reviewed: 04/01/2019  [employer] Patient Education © 2021 [employer]    Exercising to Stay Healthy  To become healthy and stay healthy, it is recommended that you do moderate-intensity and vigorous-intensity exercise. You can tell that you are exercising at a moderate intensity if your heart starts beating faster and you start breathing faster but can still hold a conversation. You can tell that you are exercising at a vigorous intensity if you are breathing much harder and faster and cannot hold a conversation while exercising.  Exercising regularly is important. It has many health benefits, such as:  Improving overall fitness, flexibility, and endurance.  Increasing bone density.  Helping with weight control.  Decreasing body fat.  Increasing muscle strength.  Reducing stress and tension.  Improving overall  health.  How often should I exercise?  Choose an activity that you enjoy, and set realistic goals. Your health care provider can help you make an activity plan that works for you.  Exercise regularly as told by your health care provider. This may include:  Doing strength training two times a week, such as:  Lifting weights.  Using resistance bands.  Push-ups.  Sit-ups.  Yoga.  Doing a certain intensity of exercise for a given amount of time. Choose from these options:  A total of 150 minutes of moderate-intensity exercise every week.  A total of 75 minutes of vigorous-intensity exercise every week.  A mix of moderate-intensity and vigorous-intensity exercise every week.  Children, pregnant women, people who have not exercised regularly, people who are overweight, and older adults may need to talk with a health care provider about what activities are safe to do. If you have a medical condition, be sure to talk with your health care provider before you start a new exercise program.  What are some exercise ideas?    Moderate-intensity exercise ideas include:  Walking 1 mile (1.6 km) in about 15 minutes.  Biking.  Hiking.  Golfing.  Dancing.  Water aerobics.  Vigorous-intensity exercise ideas include:  Walking 4.5 miles (7.2 km) or more in about 1 hour.  Jogging or running 5 miles (8 km) in about 1 hour.  Biking 10 miles (16.1 km) or more in about 1 hour.  Lap swimming.  Roller-skating or in-line skating.  Cross-country skiing.  Vigorous competitive sports, such as football, basketball, and soccer.  Jumping rope.  Aerobic dancing.  What are some everyday activities that can help me to get exercise?  Yard work, such as:  Pushing a .  Raking and bagging leaves.  Washing your car.  Pushing a stroller.  Shoveling snow.  Gardening.  Washing windows or floors.  How can I be more active in my day-to-day activities?  Use stairs instead of an elevator.  Take a walk during your lunch break.  If you drive, park your car  farther away from your work or school.  If you take public transportation, get off one stop early and walk the rest of the way.  Stand up or walk around during all of your indoor phone calls.  Get up, stretch, and walk around every 30 minutes throughout the day.  Enjoy exercise with a friend. Support to continue exercising will help you keep a regular routine of activity.  What guidelines can I follow while exercising?  Before you start a new exercise program, talk with your health care provider.  Do not exercise so much that you hurt yourself, feel dizzy, or get very short of breath.  Wear comfortable clothes and wear shoes with good support.  Drink plenty of water while you exercise to prevent dehydration or heat stroke.  Work out until your breathing and your heartbeat get faster.  Where to find more information  [place] Department of Health and Human Services: www.hhs.gov  Centers for Disease Control and Prevention (CDC): www.cdc.gov  Summary  Exercising regularly is important. It will improve your overall fitness, flexibility, and endurance.  Regular exercise also will improve your overall health. It can help you control your weight, reduce stress, and improve your bone density.  Do not exercise so much that you hurt yourself, feel dizzy, or get very short of breath.  Before you start a new exercise program, talk with your health care provider.  This information is not intended to replace advice given to you by your health care provider. Make sure you discuss any questions you have with your health care provider.  Document Revised: 11/30/2018 Document Reviewed: 11/08/2018  [employer] Patient Education © 2021 [employer]

## 2023-09-22 NOTE — PROGRESS NOTES
Male Physical Note      Date: 2023   Patient Name: Julian Willoughby  : 1980   MRN: [id number]     Chief Complaint:    Chief Complaint   Patient presents with    Annual Exam       History of Present Illness: Julian Willoughby is a 43 y.o. male who is here today for their annual health maintenance and physical.    HPI  The patient reports he is trying to lose 20 pounds, and would like to commit to running. He states since taking the famotidine he has stopped coughing.  He notes that his reflux symptoms have significantly improved with diet changes as well.  He is inquiring about seeing a gastroenterologist due to the diagnosis of his hiatal hernia.    Subjective      Review of Systems:   Review of Systems    Past Medical History, Social History, Family History and Care Team were all reviewed with patient and updated as appropriate.     Medications:     Current Outpatient Medications:     amLODIPine-valsartan (EXFORGE) 5-160 MG per tablet, Take 1 tablet by mouth Daily., Disp: 90 tablet, Rfl: 3    cetirizine (zyrTEC) 10 MG tablet, Take 1 tablet by mouth Daily., Disp: , Rfl:     famotidine (Pepcid) 20 MG tablet, Take 1 tablet by mouth Daily for 90 days., Disp: 30 tablet, Rfl: 2    fluticasone (FLONASE) 50 MCG/ACT nasal spray, 1 spray into the nostril(s) as directed by provider Daily., Disp: 16 g, Rfl: 3    Hydrocortisone Ace-Pramoxine 1-1 % rectal cream, As Needed., Disp: , Rfl:     ketoconazole (NIZORAL) 2 % cream, APPLY TOPICALLY TO THE AFFECTED AREA AT BEDTIME, Disp: , Rfl:     montelukast (SINGULAIR) 10 MG tablet, TAKE 1 TABLET EVERY NIGHT, Disp: 90 tablet, Rfl: 3    Multiple Vitamin (MULTI-VITAMIN DAILY PO), Take  by mouth As Needed., Disp: , Rfl:     olopatadine (PATADAY) 0.2 % solution ophthalmic solution, Administer 1 drop to both eyes Daily., Disp: 2.5 mL, Rfl: 3    Omega-3 Fatty Acids (fish oil) 1000 MG capsule capsule, Take 1 capsule by mouth Daily., Disp: , Rfl:     Red Yeast Rice  Extract 600 MG capsule, Take 1,200 mg by mouth Daily., Disp: , Rfl:     Allergies:   Allergies   Allergen Reactions    Claritin [Loratadine] Other (See Comments)     Depressed      Sudafed [Pseudoephedrine Hcl] Other (See Comments)     depressed    Sulfa Antibiotics GI Intolerance    Tobramycin Unknown (See Comments)       Immunizations:  Td/Tdap(Booster Q 10 yrs): UTD, 2019  Flu (Yearly):  NA recommend this fall  Pneumonia: UTD  Immunization History   Administered Date(s) Administered    COVID-19 (PFIZER) BIVALENT 12+YRS 09/21/2022    COVID-19 (PFIZER) Purple Cap Monovalent 01/12/2021, 02/03/2021, 10/04/2021    Fluzone (or Fluarix & Flulaval for VFC) >6mos 10/05/2022    Hepatitis A 08/29/2018, 03/26/2019    Influenza Seasonal Injectable 09/26/2021    Influenza, Unspecified 10/13/2015, 10/13/2016, 09/26/2017, 09/11/2018, 10/23/2019, 09/08/2020, 10/05/2022    MMR 03/16/1981, 07/12/1990    PPD Test 01/29/2002, 03/11/2003, 03/25/2003    Pneumococcal Polysaccharide (PPSV23) 01/18/2022    Tdap 10/23/2019        Colorectal Screening:   UTD doing q5 per patient  Last Completed Colonoscopy            COLORECTAL CANCER SCREENING (COLONOSCOPY - Every 5 Years) Next due on 7/12/2024 07/12/2019  COLONOSCOPY (Patient-Reported (Performed Externally))                    Hep C (Age 18-79 once):  NR    A1c: due  Lipid panel: due  The 10-year ASCVD risk score (Margarita MEDEL, et al., 2019) is: 2.2%    Values used to calculate the score:      Age: 43 years      Sex: Male      Is Non- : No      Diabetic: No      Tobacco smoker: No      Systolic Blood Pressure: 128 mmHg      Is BP treated: Yes      HDL Cholesterol: 42 mg/dL      Total Cholesterol: 181 mg/dL      Ophthalmologist: due  Dentist: regularly    Tobacco Use: Low Risk     Smoking Tobacco Use: Never    Smokeless Tobacco Use: Never    Passive Exposure: Not on file       Social History     Substance and Sexual Activity   Alcohol Use No        Social History  "    Substance and Sexual Activity   Drug Use No        Diet/Physical activity:   Well-balanced, focusing on eating more fruits and vegetables.  Has been going to the gym and trying to increase exercise.      PHQ-2 Depression Screening  PHQ-9 Total Score: 0      Objective     Physical Exam:  Vital Signs:   Vitals:    09/22/23 1456   BP: 128/80   BP Location: Left arm   Patient Position: Sitting   Cuff Size: Adult   Pulse: 80   Resp: 20   Temp: 97.8 °F (36.6 °C)   TempSrc: Temporal   Weight: 100 kg (220 lb 6 oz)   Height: 179 cm (70.47\")   PainSc: 0-No pain     Body mass index is 31.2 kg/m².     Physical Exam  Vitals reviewed.   Constitutional:       General: He is not in acute distress.     Appearance: Normal appearance. He is obese. He is not ill-appearing or toxic-appearing.   HENT:      Head: Normocephalic and atraumatic.      Right Ear: External ear normal.      Left Ear: External ear normal.      Nose: No congestion.      Mouth/Throat:      Mouth: Mucous membranes are moist.      Pharynx: No oropharyngeal exudate or posterior oropharyngeal erythema.   Eyes:      General: No scleral icterus.        Right eye: No discharge.         Left eye: No discharge.      Extraocular Movements: Extraocular movements intact.      Pupils: Pupils are equal, round, and reactive to light.   Cardiovascular:      Rate and Rhythm: Normal rate and regular rhythm.      Pulses: Normal pulses.      Heart sounds: Normal heart sounds.   Pulmonary:      Effort: Pulmonary effort is normal. No respiratory distress.      Breath sounds: Normal breath sounds. No stridor. No wheezing, rhonchi or rales.   Abdominal:      General: Abdomen is flat. Bowel sounds are normal. There is no distension.      Palpations: Abdomen is soft.      Tenderness: There is no abdominal tenderness. There is no guarding.   Musculoskeletal:         General: No swelling or deformity. Normal range of motion.      Cervical back: Normal range of motion. No rigidity. "   Skin:     General: Skin is warm and dry.      Capillary Refill: Capillary refill takes less than 2 seconds.      Coloration: Skin is not jaundiced or pale.   Neurological:      General: No focal deficit present.      Mental Status: He is alert and oriented to person, place, and time. Mental status is at baseline.   Psychiatric:         Mood and Affect: Mood normal.         Behavior: Behavior normal.         Judgment: Judgment normal.       Procedures    Assessment / Plan      Assessment/Plan:   Problem List Items Addressed This Visit          Cardiac and Vasculature    Mixed hyperlipidemia    Relevant Orders    Lipid Panel       Family History    Family history of colon cancer in mother    Relevant Orders    Ambulatory Referral to Gastroenterology       Gastrointestinal Abdominal     Hiatal hernia    Current Assessment & Plan     This is associated with GERD symptoms and chronic cough.  This is currently well controlled with once daily Pepcid 20 mg.  Patient requesting referral to gastroenterology.  Provided today.         Relevant Orders    Ambulatory Referral to Gastroenterology       Pulmonary and Pneumonias    Chronic cough    Overview     CXR in 2/2023 with reticular opacities, not noted on prior cxr. Symptoms improved after medrol dose pack.           Current Assessment & Plan     Chronic, improved with famotidine treatment for GERD.  Continue current treatment therapy.            Symptoms and Signs    Labile hypertension    Overview     Follows with Dr. Foster of cardiology. Last seen 8/26/2022.  Previously completed 24 ambulatory blood pressure monitoring with normal serum catecholamines as well as aldosterone to renin ratio.         Current Assessment & Plan     Hypertension is improving with treatment.  Continue current treatment regimen.  Blood pressure will be reassessed at the next regular appointment.          Other Visit Diagnoses       Routine health maintenance    -  Primary    Relevant Orders     CBC Auto Differential    Comprehensive Metabolic Panel    Obesity (BMI 30.0-34.9)        Relevant Orders    Hemoglobin A1c            Healthcare Maintenance:  Counseling provided based on age appropriate USPSTF guidelines.  BMI is >= 30 and <35. (Class 1 Obesity). The following options were offered after discussion;: weight loss educational material (shared in after visit summary), exercise counseling/recommendations, and nutrition counseling/recommendations         Julian Willoughby voices understanding and acceptance of this advice and will call back with any further questions or concerns. AVS with preventive healthcare tips printed for patient.     “Discussed risks/benefits to vaccination, reviewed components of the vaccine, discussed VIS, discussed informed consent, informed consent obtained. Patient/Parent was allowed to accept or refuse vaccine. Questions answered to satisfactory state of patient/Parent. We reviewed typical age appropriate and seasonally appropriate vaccinations. Reviewed immunization history and updated state vaccination form as needed. Patient was counseled on COVID-19  Influenza    Follow Up:   Return in about 6 months (around 3/22/2024) for Recheck health yweight, cholesterol..        Transcribed from ambient dictation for Lucio Mesa MD by Dominga Oliva.  09/22/23   16:00 EDT    Patient or patient representative verbalized consent to the visit recording.  I have personally performed the services described in this document as transcribed by the above individual, and it is both accurate and complete.   [facility] DREW Stuart

## 2023-09-25 NOTE — ASSESSMENT & PLAN NOTE
This is associated with GERD symptoms and chronic cough.  This is currently well controlled with once daily Pepcid 20 mg.  Patient requesting referral to gastroenterology.  Provided today.

## 2023-09-29 ENCOUNTER — LAB (OUTPATIENT)
Dept: INTERNAL MEDICINE | Facility: CLINIC | Age: 43
End: 2023-09-29
Payer: COMMERCIAL

## 2023-09-29 DIAGNOSIS — E66.9 OBESITY (BMI 30.0-34.9): ICD-10-CM

## 2023-09-29 DIAGNOSIS — E78.2 MIXED HYPERLIPIDEMIA: ICD-10-CM

## 2023-09-29 DIAGNOSIS — Z00.00 ROUTINE HEALTH MAINTENANCE: ICD-10-CM

## 2023-09-29 LAB
ALBUMIN SERPL-MCNC: 4.6 G/DL (ref 3.5–5.2)
ALBUMIN/GLOB SERPL: 1.5 G/DL
ALP SERPL-CCNC: 75 U/L (ref 39–117)
ALT SERPL W P-5'-P-CCNC: 45 U/L (ref 1–41)
ANION GAP SERPL CALCULATED.3IONS-SCNC: 11.3 MMOL/L (ref 5–15)
AST SERPL-CCNC: 30 U/L (ref 1–40)
BASOPHILS # BLD AUTO: 0.04 10*3/MM3 (ref 0–0.2)
BASOPHILS NFR BLD AUTO: 0.9 % (ref 0–1.5)
BILIRUB SERPL-MCNC: 0.7 MG/DL (ref 0–1.2)
BUN SERPL-MCNC: 15 MG/DL (ref 6–20)
BUN/CREAT SERPL: 15.6 (ref 7–25)
CALCIUM SPEC-SCNC: 9.7 MG/DL (ref 8.6–10.5)
CHLORIDE SERPL-SCNC: 101 MMOL/L (ref 98–107)
CHOLEST SERPL-MCNC: 188 MG/DL (ref 0–200)
CO2 SERPL-SCNC: 26.7 MMOL/L (ref 22–29)
CREAT SERPL-MCNC: 0.96 MG/DL (ref 0.76–1.27)
DEPRECATED RDW RBC AUTO: 41.7 FL (ref 37–54)
EGFRCR SERPLBLD CKD-EPI 2021: 100 ML/MIN/1.73
EOSINOPHIL # BLD AUTO: 0.05 10*3/MM3 (ref 0–0.4)
EOSINOPHIL NFR BLD AUTO: 1.1 % (ref 0.3–6.2)
ERYTHROCYTE [DISTWIDTH] IN BLOOD BY AUTOMATED COUNT: 13.1 % (ref 12.3–15.4)
GLOBULIN UR ELPH-MCNC: 3.1 GM/DL
GLUCOSE SERPL-MCNC: 95 MG/DL (ref 65–99)
HBA1C MFR BLD: 5.5 % (ref 4.8–5.6)
HCT VFR BLD AUTO: 51.1 % (ref 37.5–51)
HDLC SERPL-MCNC: 41 MG/DL (ref 40–60)
HGB BLD-MCNC: 17.4 G/DL (ref 13–17.7)
IMM GRANULOCYTES # BLD AUTO: 0.03 10*3/MM3 (ref 0–0.05)
IMM GRANULOCYTES NFR BLD AUTO: 0.6 % (ref 0–0.5)
LDLC SERPL CALC-MCNC: 125 MG/DL (ref 0–100)
LDLC/HDLC SERPL: 2.99 {RATIO}
LYMPHOCYTES # BLD AUTO: 1.2 10*3/MM3 (ref 0.7–3.1)
LYMPHOCYTES NFR BLD AUTO: 25.8 % (ref 19.6–45.3)
MCH RBC QN AUTO: 29.8 PG (ref 26.6–33)
MCHC RBC AUTO-ENTMCNC: 34.1 G/DL (ref 31.5–35.7)
MCV RBC AUTO: 87.7 FL (ref 79–97)
MONOCYTES # BLD AUTO: 0.56 10*3/MM3 (ref 0.1–0.9)
MONOCYTES NFR BLD AUTO: 12 % (ref 5–12)
NEUTROPHILS NFR BLD AUTO: 2.78 10*3/MM3 (ref 1.7–7)
NEUTROPHILS NFR BLD AUTO: 59.6 % (ref 42.7–76)
NRBC BLD AUTO-RTO: 0 /100 WBC (ref 0–0.2)
PLATELET # BLD AUTO: 284 10*3/MM3 (ref 140–450)
PMV BLD AUTO: 10.6 FL (ref 6–12)
POTASSIUM SERPL-SCNC: 4.7 MMOL/L (ref 3.5–5.2)
PROT SERPL-MCNC: 7.7 G/DL (ref 6–8.5)
RBC # BLD AUTO: 5.83 10*6/MM3 (ref 4.14–5.8)
SODIUM SERPL-SCNC: 139 MMOL/L (ref 136–145)
TRIGL SERPL-MCNC: 123 MG/DL (ref 0–150)
VLDLC SERPL-MCNC: 22 MG/DL (ref 5–40)
WBC NRBC COR # BLD: 4.66 10*3/MM3 (ref 3.4–10.8)

## 2023-09-29 PROCEDURE — 80053 COMPREHEN METABOLIC PANEL: CPT | Performed by: STUDENT IN AN ORGANIZED HEALTH CARE EDUCATION/TRAINING PROGRAM

## 2023-09-29 PROCEDURE — 85025 COMPLETE CBC W/AUTO DIFF WBC: CPT | Performed by: STUDENT IN AN ORGANIZED HEALTH CARE EDUCATION/TRAINING PROGRAM

## 2023-09-29 PROCEDURE — 36415 COLL VENOUS BLD VENIPUNCTURE: CPT | Performed by: STUDENT IN AN ORGANIZED HEALTH CARE EDUCATION/TRAINING PROGRAM

## 2023-09-29 PROCEDURE — 83036 HEMOGLOBIN GLYCOSYLATED A1C: CPT | Performed by: STUDENT IN AN ORGANIZED HEALTH CARE EDUCATION/TRAINING PROGRAM

## 2023-09-29 PROCEDURE — 80061 LIPID PANEL: CPT | Performed by: STUDENT IN AN ORGANIZED HEALTH CARE EDUCATION/TRAINING PROGRAM

## 2023-10-02 NOTE — PROGRESS NOTES
The 10-year ASCVD risk score (Margarita MEDEL, et al., 2019) is: 2.5%    Values used to calculate the score:      Age: 43 years      Sex: Male      Is Non- : No      Diabetic: No      Tobacco smoker: No      Systolic Blood Pressure: 128 mmHg      Is BP treated: Yes      HDL Cholesterol: 41 mg/dL      Total Cholesterol: 188 mg/dL    Your results are either normal or show only some minor abnormalities which are stable, consistent with your current clinical condition and are not felt to be concerning. Very mild elevation of liver enzyme, ALT, but overall stable. Continue healthy diet and exercise. Kidney function normal, CBC within appropriate ranges. Cholesterol slightly worsened, but in acceptable range, continue diet and exercise as discussed. Normal A1c, no signs of diabetes. Please continue your care as discussed at your last visit.      Dr. Bonita Garza

## 2023-10-12 DIAGNOSIS — J30.9 ALLERGIC RHINITIS, UNSPECIFIED SEASONALITY, UNSPECIFIED TRIGGER: ICD-10-CM

## 2023-10-12 RX ORDER — FLUTICASONE PROPIONATE 50 MCG
1 SPRAY, SUSPENSION (ML) NASAL DAILY
Qty: 16 G | Refills: 3 | Status: SHIPPED | OUTPATIENT
Start: 2023-10-12

## 2023-10-12 NOTE — TELEPHONE ENCOUNTER
"Caller: Julian Willoughby \"Pacheco\"    Relationship: Self    Best call back number: [phone]     Requested Prescriptions:   Requested Prescriptions     Pending Prescriptions Disp Refills    fluticasone (FLONASE) 50 MCG/ACT nasal spray 16 g 3     Si spray into the nostril(s) as directed by provider Daily.        Pharmacy where request should be sent: [id number] [place] [id number] [address]  AT [facility] [phone] [facility] [phone]      Last office visit with prescribing clinician: 2023   Last telemedicine visit with prescribing clinician: Visit date not found   Next office visit with prescribing clinician: Visit date not found     Additional details provided by patient: PATIENT IS OUT OF MEDICATION.    Does the patient have less than a 3 day supply:  [x] Yes  [] No    Would you like a call back once the refill request has been completed: [x] Yes [] No    If the office needs to give you a call back, can they leave a voicemail: [x] Yes [] No    Gisela Triana Rep   10/12/23 10:55 EDT         "

## 2023-10-23 DIAGNOSIS — K21.9 GASTROESOPHAGEAL REFLUX DISEASE, UNSPECIFIED WHETHER ESOPHAGITIS PRESENT: ICD-10-CM

## 2023-10-23 RX ORDER — FAMOTIDINE 20 MG/1
20 TABLET, FILM COATED ORAL DAILY
Qty: 30 TABLET | Refills: 2 | Status: SHIPPED | OUTPATIENT
Start: 2023-10-23

## 2023-11-14 NOTE — PROGRESS NOTES
Subjective:     Encounter Date: 11/15/2023    Patient ID: Julian Willoughby is a 43 y.o. 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     REMOTE PHYSICIAN: Maribel Syed MD  INTERNIST: Lucio Mesa MD  ALLERGIST: Lala Jeong MD    Chief Complaint:   Chief Complaint   Patient presents with    Mixed hyperlipidemia     Problem List:  Elevated labile blood pressure readings without definitive diagnosis of hypertension  Residual class I symptoms and normal EKG, October 2020   abnormal 24-hour ambulatory blood pressure monitor and acceptable serum catecholamine results as well as aldosterone / PRA ratio result with normal nocturnal oximetry screening study, autumn 2020  Hyperlipidemia  Hepatic steatosis  Allergic rhinitis; gets routine allergy shots  Obesity, resolved - overweight, Body mass index is 29.57 kg/m².   Surgical history:  Right tear duct repair  [place] teeth extraction    Allergies   Allergen Reactions    Claritin [Loratadine] Other (See Comments)     Depressed      Sudafed [Pseudoephedrine Hcl] Other (See Comments)     depressed    Sulfa Antibiotics GI Intolerance    Tobramycin Unknown (See Comments)       Current Outpatient Medications:     amLODIPine-valsartan (EXFORGE) 5-160 MG per tablet, Take 1 tablet by mouth Daily., Disp: 90 tablet, Rfl: 3    famotidine (PEPCID) 20 MG tablet, TAKE 1 TABLET BY MOUTH DAILY, Disp: 30 tablet, Rfl: 2    fluticasone (FLONASE) 50 MCG/ACT nasal spray, 1 spray into the nostril(s) as directed by provider Daily., Disp: 16 g, Rfl: 3    montelukast (SINGULAIR) 10 MG tablet, TAKE 1 TABLET EVERY NIGHT, Disp: 90 tablet, Rfl: 3    Multiple Vitamin (MULTI-VITAMIN DAILY PO), Take  by mouth As Needed., Disp: , Rfl:     olopatadine (PATADAY) 0.2 % solution ophthalmic  "solution, Administer 1 drop to both eyes Daily., Disp: 2.5 mL, Rfl: 3    Omega-3 Fatty Acids (fish oil) 1000 MG capsule capsule, Take 1 capsule by mouth Daily., Disp: , Rfl:     Red Yeast Rice Extract 600 MG capsule, Take 1,200 mg by mouth Daily., Disp: , Rfl:     cetirizine (zyrTEC) 10 MG tablet, Take 1 tablet by mouth Daily. (Patient not taking: Reported on 11/15/2023), Disp: , Rfl:      History of Present Illness: Julian Willoughby returns to today for routine follow-up of hypertension and hyperlipidemia.  He is a former patient of Dr. Foster after initially being referred for elevated blood pressure.  Recently, his numbers have been well controlled he is interested in coming off of medications if possible.  He has made a number of lifestyle changes and continues to take omega-3 and red yeast rice.  He does admit that there are times where he does have dietary indiscretions including some processed desserts.  He spoke with a dietitian and got some general information however is also interested in more specific changes that he can make.    Review of Systems   Constitutional: Negative for decreased appetite.   Cardiovascular:  Negative for chest pain, dyspnea on exertion, leg swelling and palpitations.        Procedures       Objective:       Vitals:    11/15/23 1357   BP: 122/78   BP Location: Right arm   Patient Position: Sitting   Pulse: 97   SpO2: 96%   Weight: 96.2 kg (212 lb)   Height: 180.3 cm (71\")     Body mass index is 29.57 kg/m².   Gen: well developed, sitting up on exam table comfortable appearing  HEENT: MMM, sclera anicteric, conjunctiva normal, no carotid bruits  CV: regular rate, regular rhythm, no murmurs or rubs, normal S1, S2. 2+ radial and DP pulses  Pulm: RA, normal work of breathing, no wheezes, rales, rhonchi  Ext: normal bulk for age, normal tone, no dependent edema  Neuro: alert, oriented, face symmetrical, moving all extremities well  Psych: normal mood, appropriate affect     Lab " Review:     Lab Results   Component Value Date    GLUCOSE 95 09/29/2023    BUN 15 09/29/2023    CREATININE 0.96 09/29/2023    BCR 15.6 09/29/2023     09/29/2023    K 4.7 09/29/2023     09/29/2023    CO2 26.7 09/29/2023    CALCIUM 9.7 09/29/2023    ALBUMIN 4.6 09/29/2023    AST 30 09/29/2023    ALT 45 (H) 09/29/2023     Lab Results   Component Value Date    WBC 4.66 09/29/2023    HGB 17.4 09/29/2023    HCT 51.1 (H) 09/29/2023    MCV 87.7 09/29/2023     09/29/2023     Lab Results   Component Value Date    CHOL 188 09/29/2023    TRIG 123 09/29/2023    HDL 41 09/29/2023     (H) 09/29/2023         Assessment:       Overall continued acceptable course with no new interim cardiac complaints with good functional status. We will defer additional diagnostic or therapeutic intervention from a cardiac perspective at this time.    Diagnosis Plan   Labile hypertension  Excellent control today, continue current regimen.      Dyslipidemia    - 10 year ASCVD risk based on PCE, low 2.3% Continue current treatment, including diet, exercise.   Recommended resources including healthy cookbook and books about a whole food, plant-based diet       Hepatosteatosis Continued focus on diet and weight loss          Plan:       Will d/c amlodipine and start valsartan alone and follow-up BP readings  Return in about 6 months (around 5/15/2024).     AMY Cazares MD  11/15/23 14:28 EST

## 2023-11-15 ENCOUNTER — OFFICE VISIT (OUTPATIENT)
Dept: CARDIOLOGY | Facility: CLINIC | Age: 43
End: 2023-11-15
Payer: COMMERCIAL

## 2023-11-15 VITALS
BODY MASS INDEX: 29.68 KG/M2 | SYSTOLIC BLOOD PRESSURE: 122 MMHG | OXYGEN SATURATION: 96 % | DIASTOLIC BLOOD PRESSURE: 78 MMHG | HEART RATE: 97 BPM | HEIGHT: 71 IN | WEIGHT: 212 LBS

## 2023-11-15 DIAGNOSIS — I10 PRIMARY HYPERTENSION: ICD-10-CM

## 2023-11-15 DIAGNOSIS — E78.2 MIXED HYPERLIPIDEMIA: Primary | ICD-10-CM

## 2023-11-15 PROCEDURE — 99213 OFFICE O/P EST LOW 20 MIN: CPT | Performed by: INTERNAL MEDICINE

## 2023-11-15 RX ORDER — VALSARTAN 160 MG/1
160 TABLET ORAL DAILY
Qty: 90 TABLET | Refills: 3 | Status: SHIPPED | OUTPATIENT
Start: 2023-11-29

## 2023-11-27 ENCOUNTER — OFFICE VISIT (OUTPATIENT)
Dept: GASTROENTEROLOGY | Facility: CLINIC | Age: 43
End: 2023-11-27
Payer: COMMERCIAL

## 2023-11-27 ENCOUNTER — OFFICE VISIT (OUTPATIENT)
Dept: INTERNAL MEDICINE | Facility: CLINIC | Age: 43
End: 2023-11-27
Payer: COMMERCIAL

## 2023-11-27 VITALS
HEART RATE: 89 BPM | SYSTOLIC BLOOD PRESSURE: 128 MMHG | RESPIRATION RATE: 20 BRPM | TEMPERATURE: 97.3 F | BODY MASS INDEX: 30.34 KG/M2 | WEIGHT: 217.5 LBS | DIASTOLIC BLOOD PRESSURE: 80 MMHG

## 2023-11-27 VITALS
OXYGEN SATURATION: 97 % | DIASTOLIC BLOOD PRESSURE: 70 MMHG | HEIGHT: 71 IN | BODY MASS INDEX: 30.38 KG/M2 | TEMPERATURE: 97.5 F | SYSTOLIC BLOOD PRESSURE: 130 MMHG | HEART RATE: 94 BPM | WEIGHT: 217 LBS

## 2023-11-27 DIAGNOSIS — E78.2 MIXED HYPERLIPIDEMIA: Primary | ICD-10-CM

## 2023-11-27 DIAGNOSIS — K21.9 GASTROESOPHAGEAL REFLUX DISEASE WITHOUT ESOPHAGITIS: Primary | ICD-10-CM

## 2023-11-27 DIAGNOSIS — Z20.828 EXPOSURE TO HERPES SIMPLEX VIRUS (HSV): ICD-10-CM

## 2023-11-27 PROCEDURE — 99214 OFFICE O/P EST MOD 30 MIN: CPT | Performed by: STUDENT IN AN ORGANIZED HEALTH CARE EDUCATION/TRAINING PROGRAM

## 2023-11-27 PROCEDURE — 99212 OFFICE O/P EST SF 10 MIN: CPT | Performed by: INTERNAL MEDICINE

## 2023-11-27 NOTE — PATIENT INSTRUCTIONS
"    Healthy Delicious Recipes from [employer]: https://oldSeeliopt.org/  [place] Plant Based Meal Recipes: https://Mobile Media Content/  Heart Healthy Recipes from [employer]: https://abcardio.org/wp-content/uploads/2020/06/ABC_Cookbook.pdf  [place] Healthy Living Guide: https://www.[facility].[place].[facility]/nutritionsource/2022/01/06/healthy-living-guide-7414-0646/  SNAP Cookbook for Budgets: http://Hooptap.net/dss/documents/good-and-cheap.pdf    Treatment for high cholesterol depends on which lipid is high. In many cases, the focus of treatment is to reduce high LDL (\"bad\") cholesterol levels. LDL cholesterol sometimes can be lowered without medication (nonpharmacological therapy), but often, medication (pharmacological therapy) is necessary.    Standard nonpharmacological therapy consists primarily of modifying diet and lifestyle. This therapy may modestly reduce LDL cholesterol, but is not likely to lower the LDL cholesterol level more than about 30 mg/dL.    In patients without atherosclerosis who have modestly elevated LDL cholesterol levels, treatment with medication is not urgent, and an initial 6-12 month trial of nonpharmacological therapy may be advised. If the LDL cholesterol falls to an acceptable level within this time, the patient can continue with this treatment only. If the level remains high, however, pharmacological therapy should be initiated.    Lifestyle changes that may lower LDL cholesterol levels include the following:    Diet:    Minimize cholesterol and fat intake, especially saturated fat, which raises cholesterol levels more than any other substance. Cholesterol and saturated fats are found primarily in foods derived from animals, such as meats and dairy products.     Dietary guidelines for reducing cholesterol and fat consumption:  Eat lean fish, poultry, and meat. Remove the skin from chicken and trim the fat from beef before cooking.    Avoid commercially prepared and " processed food (e.g., cakes, cookies) and breaded fried foods.  Increase the intake of fruits, vegetables, breads, cereals, rice, legumes (e.g., beans, peas), and pasta.    Use skim or 1% milk.    Eat no more than 2 egg yolks (or whole eggs) per week.    Use cooking oils that are high in unsaturated fat (e.g., corn, olive, canola, safflower oils)    Use soft margarine, which contains less saturated fat than butter.    The Food and Drug Administration (FDA) recently approved two cholesterol-lowering margarine products (Benecol and Take Control). These margarines contain plant-derived substances that can decrease the absorption of cholesterol in the digestive tract and may reduce cholesterol by about 7-10 percent. They should not be used instead of drug therapy, but may be added to a nonpharmacological treatment plan for high cholesterol.    Weight loss:   Losing a modest amount of weight (even 5-10 lbs.) can double the reduction in LDL levels achieved through an improved diet. Weight loss should be gradual.    Exercise:   Exercise can decrease LDL levels and increase HDL levels. For example, taking a brisk 30-minute walk 3-4 times a week can positively impact cholesterol levels. Patients with chest pain and/or known or suspected heart disease should talk to their physician before beginning any exercise program.

## 2023-11-27 NOTE — PROGRESS NOTES
Follow Up Office Visit      Date: 2023   Patient Name: Julian Willoughby  : 1980   MRN: [id number]     Chief Complaint:    Chief Complaint   Patient presents with    Follow-up     Blood work        History of Present Illness: Julian Willoughby is a 43 y.o. male who is here today to follow up with the following problems.    HPI  I reviewed note by cardiology, Dr. Kendrick Cazares MD dated 11/15/2023.  At that time recommended stopping amlodipine and starting valsartan.  Continued on red yeast rice extract and omega-3's.  Recommended follow-up in 6 months.    The 10-year ASCVD risk score (Margarita MEDEL, et al., 2019) is: 2.1%    Values used to calculate the score:      Age: 43 years      Sex: Male      Is Non- : No      Diabetic: No      Tobacco smoker: No      Systolic Blood Pressure: 128 mmHg      Is BP treated: No      HDL Cholesterol: 41 mg/dL      Total Cholesterol: 188 mg/dL      The patient consented to the use of ADELAIDE.    The patient is a 43-year-old male who is here for follow-up.    Hypertension.   Patient saw his cardiologist, Dr. Cazares on 11/15/2023 and was advised to discontinue amlodipine and start valsartan.     Hyperlipidemia.   Patient's lipid panel dated 2023 showed an LDL cholesterol of 125 mg/dL. He follows with his cardiologist and was advised to improve his diet. CT of the chest without contrast done on 2023 showed normal chest CT, severe hepatic steatosis, and small hiatal hernia. According to the patient, he loves to eat doughnuts but has been cutting back on this.    ALT elevation.   CMP dated 2023 showed an elevated ALT of 45 U/L.     Exposure to HSV.   According to the patient, his girlfriend disclosed to him that she has HSV which she got from her ex-. His girlfriend is currently taking acyclovir. Patient states that he has not been intimate with his girlfriend.         Subjective      Review of Systems:   Review of  Systems  Document verbalized in HPI.     I have reviewed the patients family history, social history, past medical history, past surgical history and have updated it as appropriate.     Medications:     Current Outpatient Medications:     cetirizine (zyrTEC) 10 MG tablet, Take 1 tablet by mouth Daily., Disp: , Rfl:     famotidine (PEPCID) 20 MG tablet, TAKE 1 TABLET BY MOUTH DAILY, Disp: 30 tablet, Rfl: 2    fluticasone (FLONASE) 50 MCG/ACT nasal spray, 1 spray into the nostril(s) as directed by provider Daily., Disp: 16 g, Rfl: 3    montelukast (SINGULAIR) 10 MG tablet, TAKE 1 TABLET EVERY NIGHT, Disp: 90 tablet, Rfl: 3    Multiple Vitamin (MULTI-VITAMIN DAILY PO), Take  by mouth As Needed., Disp: , Rfl:     olopatadine (PATADAY) 0.2 % solution ophthalmic solution, Administer 1 drop to both eyes Daily., Disp: 2.5 mL, Rfl: 3    Omega-3 Fatty Acids (fish oil) 1000 MG capsule capsule, Take 1 capsule by mouth Daily., Disp: , Rfl:     Red Yeast Rice Extract 600 MG capsule, Take 1,200 mg by mouth Daily., Disp: , Rfl:     [START ON 11/29/2023] valsartan (DIOVAN) 160 MG tablet, Take 1 tablet by mouth Daily., Disp: 90 tablet, Rfl: 3    Allergies:   Allergies   Allergen Reactions    Claritin [Loratadine] Other (See Comments)     Depressed      Sudafed [Pseudoephedrine Hcl] Other (See Comments)     depressed    Sulfa Antibiotics GI Intolerance    Tobramycin Unknown (See Comments)       Objective     Physical Exam: Please see above  Vital Signs:   Vitals:    11/27/23 1013   BP: 128/80   BP Location: Left arm   Patient Position: Sitting   Cuff Size: Adult   Pulse: 89   Resp: 20   Temp: 97.3 °F (36.3 °C)   TempSrc: Temporal   Weight: 98.7 kg (217 lb 8 oz)   PainSc: 0-No pain     Body mass index is 30.34 kg/m².          Physical Exam  Vitals reviewed.   Constitutional:       General: He is not in acute distress.     Appearance: Normal appearance. He is obese. He is not ill-appearing or toxic-appearing.   HENT:      Head:  Normocephalic and atraumatic.      Right Ear: External ear normal.      Left Ear: External ear normal.      Nose: Nose normal. No congestion.      Mouth/Throat:      Mouth: Mucous membranes are moist.   Eyes:      General: No scleral icterus.        Right eye: No discharge.         Left eye: No discharge.      Extraocular Movements: Extraocular movements intact.      Pupils: Pupils are equal, round, and reactive to light.   Cardiovascular:      Rate and Rhythm: Normal rate and regular rhythm.      Pulses: Normal pulses.      Heart sounds: Normal heart sounds.   Pulmonary:      Effort: Pulmonary effort is normal. No respiratory distress.      Breath sounds: Normal breath sounds.   Abdominal:      General: Abdomen is flat. There is no distension.      Palpations: Abdomen is soft.   Musculoskeletal:         General: No swelling or deformity. Normal range of motion.      Cervical back: Normal range of motion. No rigidity.   Skin:     General: Skin is warm and dry.      Capillary Refill: Capillary refill takes less than 2 seconds.      Coloration: Skin is not jaundiced or pale.   Neurological:      General: No focal deficit present.      Mental Status: He is alert and oriented to person, place, and time. Mental status is at baseline.   Psychiatric:         Mood and Affect: Mood normal.         Behavior: Behavior normal.         Judgment: Judgment normal.         Procedures    Results:   Labs:   Hemoglobin A1C   Date Value Ref Range Status   09/29/2023 5.50 4.80 - 5.60 % Final     TSH   Date Value Ref Range Status   11/27/2020 2.100 0.270 - 4.200 uIU/mL Final        Imaging:   No valid procedures specified.     Assessment / Plan      Assessment/Plan:   Problem List Items Addressed This Visit       Mixed hyperlipidemia - Primary     Other Visit Diagnoses       Exposure to herpes simplex virus (HSV)                  Mixed hyperlipidemia.   Advised patient to improve diet and reduce saturated fat intake. We also discussed  that there was no coronary calcifications on his prior CT scan of the chest earlier this year making him low risk for cardiovascular disease.  We discussed the potential for statin medications but patient prefers to continue healthy diet and exercise.  Follow-up in 4 months, with repeat lab testing    Exposure to herpes simplex virus (HSV).  Discussed the importance of barrier contraception to prevent transmission.  Advised patient to avoid sexual contact if partner has lesions.   Information on genital herpes provided.       Follow Up:   Return in about 4 months (around 3/27/2024) for Recheck cholesterol and healthy weight.    I spent 35 minutes caring for Julian on this date of service. This time includes time spent by me in the following activities: preparing for the visit, reviewing tests, performing a medically appropriate examination and/or evaluation, counseling and educating the patient/family/caregiver, and documenting information in the medical record      Lucio Mesa MD   [facility]  Transcribed from ambient dictation for Lucio Mesa MD by Kimberly Waters.  11/27/23   12:42 EST    Patient or patient representative verbalized consent to the visit recording.  I have personally performed the services described in this document as transcribed by the above individual, and it is both accurate and complete.

## 2023-11-27 NOTE — PROGRESS NOTES
PCP: Lucio Mesa MD    Chief Complaint   Patient presents with    Hiatal Hernia        History of Present Illness:   Julian Willoughby is a 43 y.o. male who presents to the GI clinic as a new patient consultation for abnormal imaging.  During a coughing illness, patient had ct imaging showing small hiatal hernia. He does have intermittent reflux/heartburn that is controlled on pepcid. He has no symptoms when he takes medication. Hist mother has a h/o colon cancer, last colonoscopy 4 years ago. Denies gib loss or fever.    Past Medical History:   Diagnosis Date    Allergic Mold and cats    Bronchitis     Resolved    Chicken pox     External hemorrhoid     Hiatal hernia     Hyperlipidemia     Hypertension     Internal hemorrhoid        Past Surgical History:   Procedure Laterality Date    EYE SURGERY           Current Outpatient Medications:     famotidine (PEPCID) 20 MG tablet, TAKE 1 TABLET BY MOUTH DAILY, Disp: 30 tablet, Rfl: 2    fluticasone (FLONASE) 50 MCG/ACT nasal spray, 1 spray into the nostril(s) as directed by provider Daily., Disp: 16 g, Rfl: 3    montelukast (SINGULAIR) 10 MG tablet, TAKE 1 TABLET EVERY NIGHT, Disp: 90 tablet, Rfl: 3    Multiple Vitamin (MULTI-VITAMIN DAILY PO), Take  by mouth As Needed., Disp: , Rfl:     olopatadine (PATADAY) 0.2 % solution ophthalmic solution, Administer 1 drop to both eyes Daily., Disp: 2.5 mL, Rfl: 3    Omega-3 Fatty Acids (fish oil) 1000 MG capsule capsule, Take 1 capsule by mouth Daily., Disp: , Rfl:     Red Yeast Rice Extract 600 MG capsule, Take 1,200 mg by mouth Daily., Disp: , Rfl:     [START ON 11/29/2023] valsartan (DIOVAN) 160 MG tablet, Take 1 tablet by mouth Daily., Disp: 90 tablet, Rfl: 3    cetirizine (zyrTEC) 10 MG tablet, Take 1 tablet by mouth Daily., Disp: , Rfl:     Allergies   Allergen Reactions    Claritin [Loratadine] Other (See Comments)     Depressed      Sudafed [Pseudoephedrine Hcl] Other (See Comments)     depressed    Sulfa  Antibiotics GI Intolerance    Tobramycin Unknown (See Comments)       Family History   Problem Relation Age of Onset    Cancer Mother         My mom passed away on Jan 6, 2020 from stage 4 colon-rectal cancer.    Colon cancer Mother 67    Hypertension Father         My father has hypertension.    High cholesterol Father     No Known Problems Brother     Hypertension Other        Social History     Socioeconomic History    Marital status: Single   Tobacco Use    Smoking status: Never    Smokeless tobacco: Never   Vaping Use    Vaping Use: Never used   Substance and Sexual Activity    Alcohol use: No    Drug use: No    Sexual activity: Not Currently     Partners: Female     Birth control/protection: Condom       Review of Systems  All other systems reviewed and are negative.    Vitals:    11/27/23 1458   BP: 130/70   Pulse: 94   Temp: 97.5 °F (36.4 °C)   SpO2: 97%       Physical Exam  General Appearance:  Vitals as above. no acute distress  Head/face:  Normocephalic, atraumatic  Eyes:   EOMI, no conjunctivitis or icterus   Nose/Sinuses:  Nares patent bilaterally without discharge or lesions  Mouth/Throat:  Normal oral movements without dyskinesia  Neck:  trachea is midline, no thyromegaly  Lungs:  Normal work of breathing effort, no overt rales  Heart:  No overt JVD or type VI murmur  Abdomen:  Nondistended, no guarding or rebound tenderness  Neurologic:  Alert; no focal deficits; age appropriate behavior and speech  Psychiatric: mood and affect are congruent  Vascular: extremities without edema  Skin: no rash or cyanosis.      Assessment/Plan  1.) GERD  2.) Small hiatal hernia  3.) High bmi  Discussed optimizing reflux and to not pursue surgery for controlled disease. In fact, he does not need to proceed to egd unless his symptoms are uncontrolled her change. Recommend to pursue healthy bmi and antireflux measures    4.) Family history of colon cancer  Colonoscopy in 1 year    Les Guevara MD  11/27/2023

## 2024-01-15 DIAGNOSIS — K21.9 GASTROESOPHAGEAL REFLUX DISEASE, UNSPECIFIED WHETHER ESOPHAGITIS PRESENT: ICD-10-CM

## 2024-01-15 RX ORDER — FAMOTIDINE 20 MG/1
20 TABLET, FILM COATED ORAL DAILY
Qty: 30 TABLET | Refills: 2 | Status: SHIPPED | OUTPATIENT
Start: 2024-01-15

## 2024-03-27 ENCOUNTER — TELEPHONE (OUTPATIENT)
Dept: INTERNAL MEDICINE | Facility: CLINIC | Age: 44
End: 2024-03-27

## 2024-03-27 NOTE — TELEPHONE ENCOUNTER
If patient is having severe abdominal pain, persistent bloody diarrhea, fevers, or unable to tolerate oral intake he should be seen in the emergency department.    Dr. Mesa

## 2024-03-27 NOTE — TELEPHONE ENCOUNTER
Spoke to patient and notified of message. Patient stated he woke up with cramps, stated he may have food poisoning from a pizza he ate last night. Patient mentioned he took imodium this morning, and passed some bright red blood in stool, but thought it was from a Hemorid he previously had. Patient stated he will further discuss with you tomorrow at his appointment.

## 2024-03-27 NOTE — TELEPHONE ENCOUNTER
Ate pizza last night, severe pain in lower abdominal area, diarrhea, and blood in stool.   Patient was wanting to get in earlier. He has appointment tomorrow at 3:30

## 2024-03-28 ENCOUNTER — OFFICE VISIT (OUTPATIENT)
Dept: INTERNAL MEDICINE | Facility: CLINIC | Age: 44
End: 2024-03-28
Payer: COMMERCIAL

## 2024-03-28 VITALS
HEART RATE: 68 BPM | DIASTOLIC BLOOD PRESSURE: 70 MMHG | TEMPERATURE: 97.5 F | BODY MASS INDEX: 29.18 KG/M2 | SYSTOLIC BLOOD PRESSURE: 110 MMHG | RESPIRATION RATE: 20 BRPM | OXYGEN SATURATION: 97 % | WEIGHT: 209.13 LBS

## 2024-03-28 DIAGNOSIS — E78.2 MIXED HYPERLIPIDEMIA: ICD-10-CM

## 2024-03-28 DIAGNOSIS — Z12.11 ENCOUNTER FOR COLORECTAL CANCER SCREENING: ICD-10-CM

## 2024-03-28 DIAGNOSIS — K64.9 HEMORRHOIDS, UNSPECIFIED HEMORRHOID TYPE: ICD-10-CM

## 2024-03-28 DIAGNOSIS — R09.89 LABILE HYPERTENSION: ICD-10-CM

## 2024-03-28 DIAGNOSIS — K59.00 CONSTIPATION, UNSPECIFIED CONSTIPATION TYPE: Primary | ICD-10-CM

## 2024-03-28 DIAGNOSIS — Z12.12 ENCOUNTER FOR COLORECTAL CANCER SCREENING: ICD-10-CM

## 2024-03-28 DIAGNOSIS — A05.9 FOOD POISONING: ICD-10-CM

## 2024-03-28 DIAGNOSIS — K44.9 HIATAL HERNIA: ICD-10-CM

## 2024-03-28 DIAGNOSIS — E66.9 OBESITY (BMI 30.0-34.9): ICD-10-CM

## 2024-03-28 DIAGNOSIS — Z80.0 FAMILY HISTORY OF COLON CANCER IN MOTHER: ICD-10-CM

## 2024-03-28 RX ORDER — PRAMOXINE HYDROCHLORIDE 10 MG/G
AEROSOL, FOAM TOPICAL
Qty: 15 G | Refills: 0 | Status: SHIPPED | OUTPATIENT
Start: 2024-03-28 | End: 2024-04-11

## 2024-03-28 RX ORDER — DICYCLOMINE HYDROCHLORIDE 10 MG/1
10 CAPSULE ORAL
Qty: 60 CAPSULE | Refills: 0 | Status: SHIPPED | OUTPATIENT
Start: 2024-03-28

## 2024-03-28 NOTE — PROGRESS NOTES
Follow Up Office Visit      Date: 2024   Patient Name: Julian Willoughby  : 1980   MRN: [id number]     Chief Complaint:    Chief Complaint   Patient presents with    Rectal Bleeding    Follow-up     4 month healthy weight , cholesterol        History of Present Illness: Julian Willoughby is a 43 y.o. male who is here today for a follow-up.     HPI  Weight loss  He is concerned about his fatty liver. He is working on lowering his weight. He read about fatty liver online.  He is slightly higher in weight than he should be for his body height.  He and his girlfriend have been focused on eating healthier and losing weight. He has not been pushing himself with hard exercise like he had in the past. He has been trying to gradually track the weight loss and calories he is taking and trying to eat healthier and not eat as much. He loves being active and going on walks.     Hemorrhoids   Abdominal Discomfort, concern for Food poisoning  A week before last, he was experiencing some hemorrhoid symptoms. He got a special cushion, adjusted his diet a little bit, and started drinking more fluids, especially at work. He normally eats Cheerios in the morning to get some fiber, but he was eating 2 pieces of toast for several weeks. He does not think he was getting enough fiber. His symptoms were not as bad as they were in the past. He noticed a little bit of itching and a little bit of blood with a BM. He started using some over-the-counter hemorrhoid medication that his father recommended. He inquired about Proctofoam. When he first had hemorrhoids in , he was straining for a while. He was so stressed in his job working so hard that he was not aware of it. Now, he is aware of it. He is not aware of having to strain or sit for long periods of time. He has a bowel movement almost every day.. He rates his stool as between 3 and 4 on the [place] stool chart. He eats mixed nuts, grapes, banana, granola bar,  and a cup of yogurt for lunch. He needs to add more fiber to his diet. He eats a lot when he gets home due to stress. He eats more dinner and snacks afterwards. On Tuesday, he was going home shopping for some food for Gris, decided to swig by [employer], picked up frozen pizza, and ate it. He got up early Tuesday morning at 2:30 AM with violent cramps. He sat on the toilet off and on for about 3 hours, and it cleared out his bowels. He has not eaten much since. The day before yesterday, he has been eating Jell-O and drinking liquids. He passed a little bright red blood early Tuesday morning. He wonders if it was related to the hemorrhoids. He has not had a bowel movement since. He still has cramps, but they are better. He denies any fevers, vomiting, or diarrhea. He has been taking Tylenol 500 mg and Imodium. He stopped taking Imodium yesterday. He was using a sitz bath for hemorrhoids. He is scheduled for a colonoscopy in 07/2024. He was told last year that he had some internal hemorrhoids. The antacid that was given to him is working. He had 1 incident a few weeks ago where he got up with some stomach acid. In 2006, he went to the ER for violent vomiting after reheating steak. He was prescribed Flagyl.    Sinus infection  The montelukast and steroid nasal spray are working well for him. He had a mild sinus infection in 12/2023 that was treated.    Subjective      Review of Systems:   Review of Systems    I have reviewed the patients family history, social history, past medical history, past surgical history and have updated it as appropriate.     Medications:     Current Outpatient Medications:     famotidine (PEPCID) 20 MG tablet, TAKE 1 TABLET BY MOUTH DAILY, Disp: 30 tablet, Rfl: 2    fluticasone (FLONASE) 50 MCG/ACT nasal spray, 1 spray into the nostril(s) as directed by provider Daily., Disp: 16 g, Rfl: 3    montelukast (SINGULAIR) 10 MG tablet, TAKE 1 TABLET EVERY NIGHT, Disp: 90 tablet, Rfl: 3    Multiple  Vitamin (MULTI-VITAMIN DAILY PO), Take  by mouth As Needed., Disp: , Rfl:     olopatadine (PATADAY) 0.2 % solution ophthalmic solution, Administer 1 drop to both eyes Daily., Disp: 2.5 mL, Rfl: 3    Omega-3 Fatty Acids (fish oil) 1000 MG capsule capsule, Take 1 capsule by mouth Daily., Disp: , Rfl:     Red Yeast Rice Extract 600 MG capsule, Take 1,200 mg by mouth Daily., Disp: , Rfl:     valsartan (DIOVAN) 160 MG tablet, Take 1 tablet by mouth Daily., Disp: 90 tablet, Rfl: 3    Allergies:   Allergies   Allergen Reactions    Claritin [Loratadine] Other (See Comments)     Depressed      Sudafed [Pseudoephedrine Hcl] Other (See Comments)     depressed    Sulfa Antibiotics GI Intolerance    Tobramycin Unknown (See Comments)       Objective     Physical Exam: Please see above  Vital Signs:   Vitals:    03/28/24 1505   BP: 110/70   BP Location: Left arm   Patient Position: Sitting   Cuff Size: Adult   Pulse: 68   Resp: 20   Temp: 97.5 °F (36.4 °C)   TempSrc: Temporal   SpO2: 97%   Weight: 94.9 kg (209 lb 2 oz)   PainSc:   2   PainLoc: Abdomen     Body mass index is 29.18 kg/m².          Physical Exam  Vitals reviewed. Exam conducted with a chaperone present (KANDI Haney Present).   Constitutional:       General: He is not in acute distress.     Appearance: Normal appearance. He is not ill-appearing or toxic-appearing.   HENT:      Head: Normocephalic and atraumatic.      Right Ear: External ear normal.      Left Ear: External ear normal.      Nose: Nose normal. No congestion.      Mouth/Throat:      Mouth: Mucous membranes are moist.      Pharynx: No oropharyngeal exudate or posterior oropharyngeal erythema.   Eyes:      General: No scleral icterus.        Right eye: No discharge.         Left eye: No discharge.      Extraocular Movements: Extraocular movements intact.      Pupils: Pupils are equal, round, and reactive to light.   Cardiovascular:      Rate and Rhythm: Normal rate and regular rhythm.      Pulses:  Normal pulses.      Heart sounds: Normal heart sounds.   Pulmonary:      Effort: Pulmonary effort is normal. No respiratory distress.      Breath sounds: Normal breath sounds.   Abdominal:      General: Abdomen is flat. Bowel sounds are normal. There is no distension.      Palpations: Abdomen is soft. There is no mass.      Tenderness: There is abdominal tenderness (mild generalized ttp in b/l LQ). There is no guarding or rebound.   Genitourinary:     Rectum: Normal. External hemorrhoid present. No tenderness or internal hemorrhoid.          Comments: Very small non thrombosed, external hemorrhiod  Musculoskeletal:         General: No swelling or deformity. Normal range of motion.      Cervical back: Normal range of motion. No rigidity.   Skin:     General: Skin is warm and dry.      Capillary Refill: Capillary refill takes less than 2 seconds.      Coloration: Skin is not jaundiced or pale.   Neurological:      General: No focal deficit present.      Mental Status: He is alert and oriented to person, place, and time. Mental status is at baseline.   Psychiatric:         Mood and Affect: Mood normal.         Behavior: Behavior normal.         Judgment: Judgment normal.         Procedures    Results:   Labs:   Hemoglobin A1C   Date Value Ref Range Status   09/29/2023 5.50 4.80 - 5.60 % Final     TSH   Date Value Ref Range Status   11/27/2020 2.100 0.270 - 4.200 uIU/mL Final        Imaging:   No valid procedures specified.     Assessment / Plan      Assessment/Plan:   Problem List Items Addressed This Visit       Mixed hyperlipidemia    Relevant Orders    Lipid panel    Labile hypertension    Overview     Follows with Dr. Foster of cardiology. Last seen 8/26/2022.  Previously completed 24 ambulatory blood pressure monitoring with normal serum catecholamines as well as aldosterone to renin ratio.         Relevant Orders    CBC w AUTO Differential    Comprehensive metabolic panel    Hiatal hernia    Relevant Medications     dicyclomine (BENTYL) 10 MG capsule    Family history of colon cancer in mother     Other Visit Diagnoses       Constipation, unspecified constipation type    -  Primary    Relevant Orders    TSH Rfx On Abnormal To Free T4    Food poisoning        Relevant Medications    dicyclomine (BENTYL) 10 MG capsule    Hemorrhoids, unspecified hemorrhoid type        Relevant Medications    Pramoxine HCl, Perianal, (Proctofoam) 1 % foam    Encounter for colorectal cancer screening        Obesity (BMI 30.0-34.9)        Relevant Orders    Hemoglobin A1c    TSH Rfx On Abnormal To Free T4          Hemorrhoids.  He is due for a colonoscopy.   I will prescribe Proctofoam.  I did not feel any on the internal side.   He had 1 very small hemorrhoid on the outside, but it does not look really big, inflamed, or thrombosed.   It is on the side of healing.   He will continue using the Sitz bath and maintain a good bowel regimen.    Fatty liver.  His enzymes look good.   His hemoglobin A1c was 5.5 percent.   He will focus on staying at a healthy body weight.   We will repeat his annual labs in 6 months.    Constipation.  He was advised to add more fiber to his diet.   He can try Metamucil, MiraLAX, or FiberCon.      Food poisoning.  I do not think an antibiotic would help and could potentially worsen his symptoms.  His diarrhea has since resolved and he is afebrile and well-appearing.  He is only having mild lower abdominal cramping.  Recommend against using Imodium.  I will prescribe Bentyl up to 4 times a day as needed for spasms.    Internal hemorrhoids.  He is scheduled for a colonoscopy in 07/2024.    Follow Up:   Return in about 6 months (around 9/28/2024) for Annual, Fasting.    I spent 50 minutes caring for Julian Willoughby on this date of service. This time includes time spent by me in the following activities: preparing for the visit, reviewing tests, performing a medically appropriate examination and/or evaluation, counseling and  educating the patient/family/caregiver, ordering medications, tests, or procedures, and documenting information in the medical record    Lucio Mesa MD   [facility]    Transcribed from ambient dictation for Lucio Mesa MD by Zoraida Gallardo.  03/28/24   17:20 EDT    Patient or patient representative verbalized consent to the visit recording.  I have personally performed the services described in this document as transcribed by the above individual, and it is both accurate and complete.

## 2024-04-04 ENCOUNTER — TELEPHONE (OUTPATIENT)
Dept: INTERNAL MEDICINE | Facility: CLINIC | Age: 44
End: 2024-04-04
Payer: COMMERCIAL

## 2024-04-04 DIAGNOSIS — K64.9 HEMORRHOIDS, UNSPECIFIED HEMORRHOID TYPE: Primary | ICD-10-CM

## 2024-04-04 RX ORDER — HYDROCORTISONE ACETATE 25 MG/1
25 SUPPOSITORY RECTAL 2 TIMES DAILY
Qty: 14 SUPPOSITORY | Refills: 0 | Status: SHIPPED | OUTPATIENT
Start: 2024-04-04 | End: 2024-04-11

## 2024-04-04 NOTE — TELEPHONE ENCOUNTER
Spoke to patient and he sated he still has the other medication at home but is , he stated he will call back to let us know the name of the other medication.

## 2024-04-04 NOTE — TELEPHONE ENCOUNTER
Please notify patient I have sent in hydrocortisone suppository to be used 2 times daily for a week, looks like this is covered by insurance.    Dr. Mesa

## 2024-04-04 NOTE — TELEPHONE ENCOUNTER
THE PATIENT IS CALLING BACK HE STATES THAT HE DOSE NOT WANT THE SUPPOSITORIES HE STATES THAT THERE IS A MEDICATION THAT HE HAS USED IN THE PAST FOR HIS HEMROIDES THAT HE WOULD LIKE TO GET CALLED IN     CALL [phone]

## 2024-04-04 NOTE — TELEPHONE ENCOUNTER
"  Caller: Julian Willoughby \"Pacheco\"    Relationship: Self    Best call back number:      Which medication are you concerned about: Pramoxine HCl, Perianal, (Proctofoam) 1 % foam     Who prescribed you this medication: DR CLAYTON    When did you start taking this medication: NEW ONE    What are your concerns: PATIENT ADVISED THIS IS NOT COVERED UNDER HIS INSURANCE AND NOT WORKING TOO WELL FOR HIM; HE WOULD LIKE A CALL BACK FROM THE NURSE TO DISCUSS    "

## 2024-04-05 ENCOUNTER — TELEPHONE (OUTPATIENT)
Dept: INTERNAL MEDICINE | Facility: CLINIC | Age: 44
End: 2024-04-05
Payer: COMMERCIAL

## 2024-04-05 ENCOUNTER — TELEPHONE (OUTPATIENT)
Dept: INTERNAL MEDICINE | Facility: CLINIC | Age: 44
End: 2024-04-05

## 2024-04-05 DIAGNOSIS — K64.9 HEMORRHOIDS, UNSPECIFIED HEMORRHOID TYPE: ICD-10-CM

## 2024-04-05 RX ORDER — HYDROCORTISONE ACETATE PRAMOXINE HCL 2.5; 1 G/100G; G/100G
CREAM TOPICAL 3 TIMES DAILY
Qty: 30 G | Refills: 0 | Status: SHIPPED | OUTPATIENT
Start: 2024-04-05 | End: 2024-04-18

## 2024-04-05 RX ORDER — PRAMOXINE HYDROCHLORIDE 10 MG/G
AEROSOL, FOAM TOPICAL
Qty: 15 G | Refills: 0 | Status: CANCELLED | OUTPATIENT
Start: 2024-04-05 | End: 2024-04-19

## 2024-04-05 NOTE — TELEPHONE ENCOUNTER
Approvedtoday  CaseId:[id number];Status:Approved;Review Type:Prior Auth;Coverage Start Date:03/06/2024;Coverage End Date:04/05/2025;

## 2024-04-05 NOTE — TELEPHONE ENCOUNTER
"    Caller: Case Julian Cade \"Pacheco\"    Relationship: Self    Best call back number: [phone]     Which medication are you concerned about: Hydrocort-Pramoxine, Perianal, (ANALPRAM-HC) 2.5-1 % rectal cream     Who prescribed you this medication: DR CLAYTON    When did you start taking this medication: THE PATIENT ADVISED THAT HE TOOK THIS TWO YEARS AGO, BUT WAS RECENTLY PRESCRIBED BY DR CLAYTON     What are your concerns: PRIOR AUTHORIZATION REQUIRED PER [facility],     How long have you had these concerns: SINCE 04/05/2024. THE PATIENT STATED HE SPOKE WITH [facility] AND THEY ADVISED THE PATIENT TO CALL THE PRACTICE TO CHECK ON THE STATUS.     PLEASE CALL THE PATIENT AND ADVISE.     "

## 2024-04-18 ENCOUNTER — OFFICE VISIT (OUTPATIENT)
Dept: INTERNAL MEDICINE | Facility: CLINIC | Age: 44
End: 2024-04-18
Payer: COMMERCIAL

## 2024-04-18 VITALS
BODY MASS INDEX: 30.02 KG/M2 | SYSTOLIC BLOOD PRESSURE: 128 MMHG | TEMPERATURE: 97 F | WEIGHT: 215.13 LBS | DIASTOLIC BLOOD PRESSURE: 80 MMHG | RESPIRATION RATE: 20 BRPM | HEART RATE: 88 BPM

## 2024-04-18 DIAGNOSIS — K21.9 GASTROESOPHAGEAL REFLUX DISEASE, UNSPECIFIED WHETHER ESOPHAGITIS PRESENT: ICD-10-CM

## 2024-04-18 DIAGNOSIS — K64.9 HEMORRHOIDS, UNSPECIFIED HEMORRHOID TYPE: Primary | ICD-10-CM

## 2024-04-18 PROCEDURE — 99214 OFFICE O/P EST MOD 30 MIN: CPT | Performed by: STUDENT IN AN ORGANIZED HEALTH CARE EDUCATION/TRAINING PROGRAM

## 2024-04-18 RX ORDER — FAMOTIDINE 20 MG/1
20 TABLET, FILM COATED ORAL DAILY
Qty: 30 TABLET | Refills: 2 | Status: SHIPPED | OUTPATIENT
Start: 2024-04-18 | End: 2024-04-18 | Stop reason: SDUPTHER

## 2024-04-18 RX ORDER — FAMOTIDINE 20 MG/1
20 TABLET, FILM COATED ORAL DAILY
Qty: 30 TABLET | Refills: 11 | Status: SHIPPED | OUTPATIENT
Start: 2024-04-18

## 2024-04-18 NOTE — PROGRESS NOTES
Follow Up Office Visit      Date: 2024   Patient Name: Julian Willoughby  : 1980   MRN: [id number]     Chief Complaint:    Chief Complaint   Patient presents with    Hemorrhoids       History of Present Illness: Julian Willoughby is a 43 y.o. male who is here today for hemorrhoids.    HPI      Subjective      Review of Systems:   Review of Systems    I have reviewed the patients family history, social history, past medical history, past surgical history and have updated it as appropriate.     Medications:     Current Outpatient Medications:     famotidine (PEPCID) 20 MG tablet, TAKE 1 TABLET BY MOUTH DAILY, Disp: 30 tablet, Rfl: 2    fluticasone (FLONASE) 50 MCG/ACT nasal spray, 1 spray into the nostril(s) as directed by provider Daily., Disp: 16 g, Rfl: 3    montelukast (SINGULAIR) 10 MG tablet, TAKE 1 TABLET EVERY NIGHT, Disp: 90 tablet, Rfl: 3    Multiple Vitamin (MULTI-VITAMIN DAILY PO), Take  by mouth As Needed., Disp: , Rfl:     olopatadine (PATADAY) 0.2 % solution ophthalmic solution, Administer 1 drop to both eyes Daily., Disp: 2.5 mL, Rfl: 3    Omega-3 Fatty Acids (fish oil) 1000 MG capsule capsule, Take 1 capsule by mouth Daily., Disp: , Rfl:     Red Yeast Rice Extract 600 MG capsule, Take 1,200 mg by mouth Daily., Disp: , Rfl:     valsartan (DIOVAN) 160 MG tablet, Take 1 tablet by mouth Daily., Disp: 90 tablet, Rfl: 3    Hydrocort-Pramoxine, Perianal, (ANALPRAM-HC) 2.5-1 % rectal cream, Insert  into the rectum 3 (Three) Times a Day. Use for 7 days, Disp: 30 g, Rfl: 0    Allergies:   Allergies   Allergen Reactions    Claritin [Loratadine] Other (See Comments)     Depressed      Sudafed [Pseudoephedrine Hcl] Other (See Comments)     depressed    Sulfa Antibiotics GI Intolerance    Tobramycin Unknown (See Comments)       Objective     Physical Exam: Please see above  Vital Signs:   Vitals:    24 1415   BP: 128/80   BP Location: Left arm   Patient Position: Sitting   Cuff Size:  Adult   Pulse: 88   Resp: 20   Temp: 97 °F (36.1 °C)   TempSrc: Temporal   Weight: 97.6 kg (215 lb 2 oz)   PainSc: 0-No pain     Body mass index is 30.02 kg/m².    Physical Exam    Procedures    Results:   Labs:   Hemoglobin A1C   Date Value Ref Range Status   09/29/2023 5.50 4.80 - 5.60 % Final     TSH   Date Value Ref Range Status   11/27/2020 2.100 0.270 - 4.200 uIU/mL Final        Imaging:   No valid procedures specified.     Assessment / Plan      Assessment/Plan:   Problem List Items Addressed This Visit    None        Follow Up:   No follow-ups on file.    I have spent a total of *** min on reviewing test results/preparing to see patient, counseling patient, performing medically appropriate exam and documenting clinical information in the electronic or other health record     Lucio Mesa MD  [facility]

## 2024-04-18 NOTE — PROGRESS NOTES
Follow Up Office Visit      Date: 2024   Patient Name: Julian Willoughby  : 1980   MRN: [id number]     Chief Complaint:    Chief Complaint   Patient presents with    Hemorrhoids       History of Present Illness: Julian Willoughby is a 43 y.o. male who is here today for the following.    HPI  History of Present Illness  The patient presents via virtual visit for evaluation of multiple medical concerns.    The patient has been experiencing a challenging recovery period. He attempted to procure Proctofoam, but due to insurance constraints, he procured it out of pocket. His father had previously used a steroid-based device, but upon his return home, he discovered the device was unavailable. He attempted to use suppositories, but experienced discomfort and financial constraints. After a few days, he began to feel better, but the pain recurred and he noticed blood when wiping. He initiated a 7-day treatment regimen, including sitz baths, hemorrhoid wipes, and dietary modifications. Currently, he reports no pain, regular bowel movements, and no bleeding. He found the proctofoam version effective, but [facility] did not have it. He has not collected the prescription or suppository due to discomfort with this route of medication. He is seeking advice on whether further treatment options are necessary based on his recovery. He attributes his improvement to a combination of factors, dietary modifications, and the use of hemorrhoid wipes. He attributes his poor fiber intake to consuming only 2 slices of toast every morning for 2 weeks. He is considering trying over-the-counter hemorrhoid medications, having previously used hydrocortisone peroxide, which effectively alleviated his symptoms. He plans to verify the proctofoam version at  pharmacy. He plans to incorporate fiber into his diet, including oatmeal, Cheerios crunch, almond slivers, vegetables, fruits, and white rice, and quinoa. He also enjoys  spicy foods. He experienced a similar episode in 2023, for which he used hydrocortisone peroxide for a week.    The patient requests a refill of his famotidine prescription, as he believes he has exhausted his supply. His insurance typically covers a 30-day supply, but he typically receives a 90-day supply through [facility].        Subjective      Review of Systems:   Review of Systems    I have reviewed the patients family history, social history, past medical history, past surgical history and have updated it as appropriate.     Medications:     Current Outpatient Medications:     famotidine (PEPCID) 20 MG tablet, Take 1 tablet by mouth Daily., Disp: 30 tablet, Rfl: 11    fluticasone (FLONASE) 50 MCG/ACT nasal spray, 1 spray into the nostril(s) as directed by provider Daily., Disp: 16 g, Rfl: 3    montelukast (SINGULAIR) 10 MG tablet, TAKE 1 TABLET EVERY NIGHT, Disp: 90 tablet, Rfl: 3    Multiple Vitamin (MULTI-VITAMIN DAILY PO), Take  by mouth As Needed., Disp: , Rfl:     olopatadine (PATADAY) 0.2 % solution ophthalmic solution, Administer 1 drop to both eyes Daily., Disp: 2.5 mL, Rfl: 3    Omega-3 Fatty Acids (fish oil) 1000 MG capsule capsule, Take 1 capsule by mouth Daily., Disp: , Rfl:     Red Yeast Rice Extract 600 MG capsule, Take 1,200 mg by mouth Daily., Disp: , Rfl:     valsartan (DIOVAN) 160 MG tablet, Take 1 tablet by mouth Daily., Disp: 90 tablet, Rfl: 3    Allergies:   Allergies   Allergen Reactions    Claritin [Loratadine] Other (See Comments)     Depressed      Sudafed [Pseudoephedrine Hcl] Other (See Comments)     depressed    Sulfa Antibiotics GI Intolerance    Tobramycin Unknown (See Comments)       Objective     Physical Exam: Please see above  Vital Signs:   Vitals:    04/18/24 1415   BP: 128/80   BP Location: Left arm   Patient Position: Sitting   Cuff Size: Adult   Pulse: 88   Resp: 20   Temp: 97 °F (36.1 °C)   TempSrc: Temporal   Weight: 97.6 kg (215 lb 2 oz)   PainSc: 0-No pain      Body mass index is 30.02 kg/m².    Physical Exam  Physical Exam  Constitutional:       General: He is not in acute distress.     Appearance: Normal appearance. He is obese. He is not ill-appearing or toxic-appearing.   HENT:      Head: Normocephalic and atraumatic.      Right Ear: External ear normal.      Left Ear: External ear normal.      Nose: Nose normal.      Mouth/Throat:      Mouth: Mucous membranes are moist.   Cardiovascular:      Rate and Rhythm: Normal rate.   Pulmonary:      Effort: Pulmonary effort is normal. No respiratory distress.   Abdominal:      General: Abdomen is flat. There is no distension.   Genitourinary:     Comments: deferred  Neurological:      General: No focal deficit present.      Mental Status: He is alert and oriented to person, place, and time.   Psychiatric:         Mood and Affect: Mood normal.         Behavior: Behavior normal.         Judgment: Judgment normal.     Physical Exam        Procedures    Results:   Labs:   Hemoglobin A1C   Date Value Ref Range Status   09/29/2023 5.50 4.80 - 5.60 % Final     TSH   Date Value Ref Range Status   11/27/2020 2.100 0.270 - 4.200 uIU/mL Final      Results        Imaging:   No valid procedures specified.     Assessment / Plan      Assessment/Plan:   Problem List Items Addressed This Visit    None  Visit Diagnoses       Hemorrhoids, unspecified hemorrhoid type    -  Primary    Gastroesophageal reflux disease, unspecified whether esophagitis present        Relevant Medications    famotidine (PEPCID) 20 MG tablet            Assessment & Plan  1. Hemorrhoids.  Chronic, improving with treatment  The patient has incorporated a diet rich in fiber and has successfully completed Proctofoam, resulting in a near resolution of symptoms. The patient was counseled on the importance of dietary and exercise modifications to mitigate the recurrence of the hemorrhoids. In the event of recurrence, the recommencement of Analpram for symptomatic  hemorrhoids is advised.    2. Gastroesophageal Reflux Disease (GERD).  The patient's GERD is chronic but showing signs of improvement. The patient will maintain his current regimen of Pepcid 20 mg daily, and a refill was provided today.       Follow Up:   Return if symptoms worsen or fail to improve.        Lucio Mesa MD  [facility]    Patient or patient representative verbalized consent for the use of Ambient Listening during the visit with  Lucio Mesa MD for chart documentation. 4/18/2024  15:07 EDT

## 2024-05-29 ENCOUNTER — OFFICE VISIT (OUTPATIENT)
Dept: CARDIOLOGY | Facility: CLINIC | Age: 44
End: 2024-05-29
Payer: COMMERCIAL

## 2024-05-29 VITALS
SYSTOLIC BLOOD PRESSURE: 134 MMHG | WEIGHT: 212.2 LBS | OXYGEN SATURATION: 96 % | DIASTOLIC BLOOD PRESSURE: 82 MMHG | BODY MASS INDEX: 29.71 KG/M2 | HEIGHT: 71 IN | HEART RATE: 99 BPM

## 2024-05-29 DIAGNOSIS — E78.2 MIXED HYPERLIPIDEMIA: Primary | ICD-10-CM

## 2024-05-29 PROCEDURE — 99213 OFFICE O/P EST LOW 20 MIN: CPT | Performed by: INTERNAL MEDICINE

## 2024-05-29 NOTE — PROGRESS NOTES
Subjective:     Encounter Date: 05/29/2024    Patient ID: Julian Willoughby is a 43 y.o. 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     REMOTE PHYSICIAN: Maribel Syed MD  INTERNIST: Lucio Mesa MD  ALLERGIST: Lala Jeong MD    Chief Complaint:   Chief Complaint   Patient presents with    Mixed hyperlipidemia     6-Mo F/U     Problem List:  Elevated labile blood pressure readings / hypertension  Residual class I symptoms and normal EKG, October 2020   abnormal 24-hour ambulatory blood pressure monitor and acceptable serum catecholamine results as well as aldosterone / PRA ratio result with normal nocturnal oximetry screening study, autumn 2020  Hyperlipidemia  Hepatic steatosis  Allergic rhinitis; gets routine allergy shots  Obesity, resolved - overweight, Body mass index is 29.6 kg/m².   Surgical history:  Right tear duct repair  [place] teeth extraction    Allergies   Allergen Reactions    Claritin [Loratadine] Other (See Comments)     Depressed      Sudafed [Pseudoephedrine Hcl] Other (See Comments)     depressed    Sulfa Antibiotics GI Intolerance    Tobramycin Unknown (See Comments)       Current Outpatient Medications:     famotidine (PEPCID) 20 MG tablet, Take 1 tablet by mouth Daily., Disp: 30 tablet, Rfl: 11    fluticasone (FLONASE) 50 MCG/ACT nasal spray, 1 spray into the nostril(s) as directed by provider Daily., Disp: 16 g, Rfl: 3    montelukast (SINGULAIR) 10 MG tablet, TAKE 1 TABLET EVERY NIGHT, Disp: 90 tablet, Rfl: 3    Multiple Vitamin (MULTI-VITAMIN DAILY PO), Take  by mouth As Needed., Disp: , Rfl:     olopatadine (PATADAY) 0.2 % solution ophthalmic solution, Administer 1 drop to both eyes Daily., Disp: 2.5 mL, Rfl: 3    Omega-3 Fatty Acids (fish oil) 1000 MG capsule capsule, Take  "1 capsule by mouth Daily., Disp: , Rfl:     Red Yeast Rice Extract 600 MG capsule, Take 1,200 mg by mouth Daily., Disp: , Rfl:     valsartan (DIOVAN) 160 MG tablet, Take 1 tablet by mouth Daily., Disp: 90 tablet, Rfl: 3     History of Present Illness: Julian Willoughby returns to today for routine follow-up of hypertension and hyperlipidemia.  He is a former patient of Dr. Foster after initially being referred for elevated blood pressure.  Since his last visit in November he reports feeling well.  His blood pressures remain well-controlled at home and he has been doing well with dietary changes and plan healthy eating.    Review of Systems   Constitutional: Negative for decreased appetite.   Cardiovascular:  Negative for chest pain, dyspnea on exertion, leg swelling and palpitations.        Procedures       Objective:       Vitals:    05/29/24 0854   BP: 134/82   BP Location: Right arm   Patient Position: Sitting   Cuff Size: Adult   Pulse: 99   SpO2: 96%   Weight: 96.3 kg (212 lb 3.2 oz)   Height: 180.3 cm (71\")     Body mass index is 29.6 kg/m².   Gen: well developed, sitting up on exam table comfortable appearing  HEENT: MMM, sclera anicteric, conjunctiva normal, no carotid bruits  CV: regular rate, regular rhythm, no murmurs or rubs, normal S1, S2. 2+ radial and DP pulses  Pulm: RA, normal work of breathing, no wheezes, rales, rhonchi  Ext: normal bulk for age, normal tone, no dependent edema  Neuro: alert, oriented, face symmetrical, moving all extremities well  Psych: normal mood, appropriate affect     Lab Review:     Lab Results   Component Value Date    GLUCOSE 95 09/29/2023    BUN 15 09/29/2023    CREATININE 0.96 09/29/2023    BCR 15.6 09/29/2023     09/29/2023    K 4.7 09/29/2023     09/29/2023    CO2 26.7 09/29/2023    CALCIUM 9.7 09/29/2023    ALBUMIN 4.6 09/29/2023    AST 30 09/29/2023    ALT 45 (H) 09/29/2023     Lab Results   Component Value Date    WBC 4.66 09/29/2023    HGB 17.4 " 09/29/2023    HCT 51.1 (H) 09/29/2023    MCV 87.7 09/29/2023     09/29/2023     Lab Results   Component Value Date    CHOL 188 09/29/2023    TRIG 123 09/29/2023    HDL 41 09/29/2023     (H) 09/29/2023         Assessment:       Overall continued acceptable course with no new interim cardiac complaints with good functional status. We will defer additional diagnostic or therapeutic intervention from a cardiac perspective at this time.    Diagnosis Plan   Hypertension  well controlled today, no changes recommended     Dyslipidemia    The 10-year ASCVD risk score (Margarita MEDEL, et al., 2019) is: 2.7%    Values used to calculate the score:      Age: 43 years      Sex: Male      Is Non- : No      Diabetic: No      Tobacco smoker: No      Systolic Blood Pressure: 134 mmHg      Is BP treated: Yes      HDL Cholesterol: 41 mg/dL      Total Cholesterol: 188 mg/dL    Continue current treatment, including diet, exercise.   Recommended resources including healthy cookbook and books about a whole food, plant-based diet    Check Lp(a), would consider statin initiation for primary prevention if level is elevated       Hepatosteatosis Continued focus on diet and weight loss          Plan:       No med changes recommended, pt interested in coming off medications but discussed that with the numbers being well controlled today will defer any decrease at this time.  If his blood pressures consistently in the 100 systolic or he is significant weight gain we could reconsider  No statin therapy at this time, if Lp(a) is elevated will consider addition for primary prevention  Return in about 1 year (around 5/29/2025).     AMY Cazares MD  05/29/24 08:59 EDT

## 2024-06-25 DIAGNOSIS — J30.9 ALLERGIC RHINITIS, UNSPECIFIED SEASONALITY, UNSPECIFIED TRIGGER: ICD-10-CM

## 2024-06-25 RX ORDER — MONTELUKAST SODIUM 10 MG/1
TABLET ORAL
Qty: 90 TABLET | Refills: 3 | Status: SHIPPED | OUTPATIENT
Start: 2024-06-25

## 2024-07-12 ENCOUNTER — OFFICE VISIT (OUTPATIENT)
Dept: INTERNAL MEDICINE | Facility: CLINIC | Age: 44
End: 2024-07-12
Payer: COMMERCIAL

## 2024-07-12 VITALS
HEART RATE: 110 BPM | WEIGHT: 219 LBS | SYSTOLIC BLOOD PRESSURE: 122 MMHG | HEIGHT: 71 IN | RESPIRATION RATE: 18 BRPM | DIASTOLIC BLOOD PRESSURE: 80 MMHG | BODY MASS INDEX: 30.66 KG/M2 | OXYGEN SATURATION: 96 % | TEMPERATURE: 98.4 F

## 2024-07-12 DIAGNOSIS — R05.9 COUGH, UNSPECIFIED TYPE: ICD-10-CM

## 2024-07-12 DIAGNOSIS — R09.89 LABILE HYPERTENSION: ICD-10-CM

## 2024-07-12 DIAGNOSIS — U07.1 COVID: Primary | ICD-10-CM

## 2024-07-12 DIAGNOSIS — E78.2 MIXED HYPERLIPIDEMIA: ICD-10-CM

## 2024-07-12 LAB
EXPIRATION DATE: ABNORMAL
FLUAV AG UPPER RESP QL IA.RAPID: NOT DETECTED
FLUBV AG UPPER RESP QL IA.RAPID: NOT DETECTED
INTERNAL CONTROL: ABNORMAL
Lab: ABNORMAL
SARS-COV-2 AG UPPER RESP QL IA.RAPID: DETECTED

## 2024-07-12 PROCEDURE — 87428 SARSCOV & INF VIR A&B AG IA: CPT | Performed by: NURSE PRACTITIONER

## 2024-07-12 PROCEDURE — 99214 OFFICE O/P EST MOD 30 MIN: CPT | Performed by: NURSE PRACTITIONER

## 2024-07-12 RX ORDER — DEXTROMETHORPHAN HYDROBROMIDE AND PROMETHAZINE HYDROCHLORIDE 15; 6.25 MG/5ML; MG/5ML
5 SYRUP ORAL 3 TIMES DAILY PRN
Qty: 60 ML | Refills: 0 | Status: SHIPPED | OUTPATIENT
Start: 2024-07-12

## 2024-07-12 NOTE — PROGRESS NOTES
Patient Name: Julian Willoughby  : 1980   MRN: [id number]     Chief Complaint:    Chief Complaint   Patient presents with    exposure covid 19     Pts dad was dx with covid 7/10/24       History of Present Illness: Julian Willoughby is a 43 y.o. male.  History of Present Illness  The patient presents for evaluation of COVID-19.    The patient tested positive for COVID-19 and began experiencing symptoms yesterday evening. His father was diagnosed with COVID-19 on Wednesday, prompting him to wear a mask. He has no prior history of COVID-19 infection and has received COVID-19 boosters in the past few years. His symptoms included congestion since last night, a runny nose, a mild cough, and a mild headache this morning. He has not taken any medication for his symptoms. His current medication regimen includes blood pressure medication, famotidine for reflux, and montelukast for allergies. He also takes red yeast rice, and omega-for hyperlipidemia.  He has no complaints of shortness of breath or wheezing. His blood pressure has been controlled on his current regimen of valsartan 160 mg daily.  He also takes         Subjective     Review of System: Review of Systems     Medications:     Current Outpatient Medications:     famotidine (PEPCID) 20 MG tablet, Take 1 tablet by mouth Daily., Disp: 30 tablet, Rfl: 11    fluticasone (FLONASE) 50 MCG/ACT nasal spray, 1 spray into the nostril(s) as directed by provider Daily., Disp: 16 g, Rfl: 3    montelukast (SINGULAIR) 10 MG tablet, TAKE 1 TABLET EVERY NIGHT, Disp: 90 tablet, Rfl: 3    Multiple Vitamin (MULTI-VITAMIN DAILY PO), Take  by mouth As Needed., Disp: , Rfl:     olopatadine (PATADAY) 0.2 % solution ophthalmic solution, Administer 1 drop to both eyes Daily., Disp: 2.5 mL, Rfl: 3    Omega-3 Fatty Acids (fish oil) 1000 MG capsule capsule, Take 1 capsule by mouth Daily., Disp: , Rfl:     Red Yeast Rice Extract 600 MG capsule, Take 1,200 mg by mouth Daily.,  "Disp: , Rfl:     valsartan (DIOVAN) 160 MG tablet, Take 1 tablet by mouth Daily., Disp: 90 tablet, Rfl: 3    Nirmatrelvir & Ritonavir, 300mg/100mg, (PAXLOVID), Take 3 tablets by mouth 2 (Two) Times a Day., Disp: 30 each, Rfl: 0    promethazine-dextromethorphan (PROMETHAZINE-DM) 6.25-15 MG/5ML syrup, Take 5 mL by mouth 3 (Three) Times a Day As Needed for Cough., Disp: 60 mL, Rfl: 0    Allergies:   Allergies   Allergen Reactions    Claritin [Loratadine] Other (See Comments)     Depressed      Sudafed [Pseudoephedrine Hcl] Other (See Comments)     depressed    Sulfa Antibiotics GI Intolerance    Tobramycin Unknown (See Comments)       Objective     Physical Exam:   Vital Signs:   Vitals:    07/12/24 1004   BP: 122/80   BP Location: Right arm   Patient Position: Sitting   Cuff Size: Adult   Pulse: 110   Resp: 18   Temp: 98.4 °F (36.9 °C)   TempSrc: Infrared   SpO2: 96%   Weight: 99.3 kg (219 lb)   Height: 180.3 cm (71\")   PainSc: 0-No pain     Body mass index is 30.54 kg/m².        Physical Exam  Constitutional:       General: He is not in acute distress.     Appearance: He is not ill-appearing.   HENT:      Head: Normocephalic.      Right Ear: There is impacted cerumen.      Left Ear: Tympanic membrane normal. There is impacted cerumen.      Nose: No congestion or rhinorrhea.      Mouth/Throat:      Pharynx: No posterior oropharyngeal erythema.   Cardiovascular:      Rate and Rhythm: Normal rate and regular rhythm.      Heart sounds: Normal heart sounds. No murmur heard.  Pulmonary:      Breath sounds: Normal breath sounds.   Abdominal:      General: Bowel sounds are normal.      Tenderness: There is no abdominal tenderness.   Lymphadenopathy:      Cervical: No cervical adenopathy.   Neurological:      General: No focal deficit present.      Mental Status: He is oriented to person, place, and time.   Psychiatric:         Mood and Affect: Mood normal.     Physical Exam         Results  Laboratory Studies  Covid test " positive.     Assessment / Plan      Assessment/Plan:   Diagnoses and all orders for this visit:    1. COVID (Primary)  -     Nirmatrelvir & Ritonavir, 300mg/100mg, (PAXLOVID); Take 3 tablets by mouth 2 (Two) Times a Day.  Dispense: 30 each; Refill: 0  -     promethazine-dextromethorphan (PROMETHAZINE-DM) 6.25-15 MG/5ML syrup; Take 5 mL by mouth 3 (Three) Times a Day As Needed for Cough.  Dispense: 60 mL; Refill: 0    2. Cough, unspecified type  -     POCT SARS-CoV-2 + Flu Antigen GEORGE  -     promethazine-dextromethorphan (PROMETHAZINE-DM) 6.25-15 MG/5ML syrup; Take 5 mL by mouth 3 (Three) Times a Day As Needed for Cough.  Dispense: 60 mL; Refill: 0    3. Mixed hyperlipidemia    4. Labile hypertension       Assessment & Plan  1. COVID-19.  The effects of valsartan may increase with Paxlovid. The patient is advised to monitor his blood pressure. Promethazine DM has been prescribed. The common side effects of his medications have been discussed, and a review of his list has been conducted. Red flags and how to follow up have been discussed, particularly if he experiences any difficulty breathing. The patient demonstrated understanding and agreement with the plan of care.  He will hold his omega-3 and red yeast extract while taking Paxlovid.    Follow-up  The patient is scheduled for a follow-up visit should his symptoms worsen.         Follow Up:   He will follow-up if no improvement in symptoms or worsening.  Patient or patient representative verbalized consent for the use of Ambient Listening during the visit with  MICHAEL Holman for chart documentation. 7/14/2024  17:49 EDT    MICHAEL Holman  [facility]

## 2024-07-15 ENCOUNTER — OFFICE VISIT (OUTPATIENT)
Dept: INTERNAL MEDICINE | Facility: CLINIC | Age: 44
End: 2024-07-15
Payer: COMMERCIAL

## 2024-07-15 VITALS
DIASTOLIC BLOOD PRESSURE: 78 MMHG | TEMPERATURE: 97.8 F | RESPIRATION RATE: 20 BRPM | OXYGEN SATURATION: 97 % | HEART RATE: 87 BPM | BODY MASS INDEX: 29.86 KG/M2 | WEIGHT: 214.13 LBS | SYSTOLIC BLOOD PRESSURE: 130 MMHG

## 2024-07-15 DIAGNOSIS — U07.1 COVID: ICD-10-CM

## 2024-07-15 DIAGNOSIS — R09.81 SINUS CONGESTION: Primary | ICD-10-CM

## 2024-07-15 PROCEDURE — 99213 OFFICE O/P EST LOW 20 MIN: CPT | Performed by: STUDENT IN AN ORGANIZED HEALTH CARE EDUCATION/TRAINING PROGRAM

## 2024-07-15 NOTE — PROGRESS NOTES
Follow Up Office Visit      Date: 07/15/2024   Patient Name: Julian Willoughby  : 1980   MRN: [id number]     Chief Complaint:    Chief Complaint   Patient presents with    Sore Throat    Cough    Chills    URI       History of Present Illness: Julian Willoughby is a 43 y.o. male who is here today for covid.    HPI  History of Present Illness  The patient presents for evaluation of COVID-19 symptoms.    The patient tested positive for COVID-19 last Friday, for which he was prescribed Paxlovid by a nurse practitioner. However, he has yet to commence the medication due to concerns about potential interactions with red yeast rice extract. His symptoms, which commenced last Thursday, include a mild sore throat, sleep disturbances, coughing fits, and inflammation in his throat. His symptoms have been waxing and waning, and he notes his symptoms of congestion and cough typically are worse in the evenings. He denies experiencing shortness of breath and has been self-medicating with Ricola cough drops and promethazine DM, as recommended by his girlfriend. He has also tried Vicks VapoRub, which provided some relief. He has been afebrile. He denies shortness of breath.        Subjective      Review of Systems:   Review of Systems    I have reviewed the patients family history, social history, past medical history, past surgical history and have updated it as appropriate.     Medications:     Current Outpatient Medications:     famotidine (PEPCID) 20 MG tablet, Take 1 tablet by mouth Daily., Disp: 30 tablet, Rfl: 11    fluticasone (FLONASE) 50 MCG/ACT nasal spray, 1 spray into the nostril(s) as directed by provider Daily., Disp: 16 g, Rfl: 3    montelukast (SINGULAIR) 10 MG tablet, TAKE 1 TABLET EVERY NIGHT, Disp: 90 tablet, Rfl: 3    Multiple Vitamin (MULTI-VITAMIN DAILY PO), Take  by mouth As Needed., Disp: , Rfl:     olopatadine (PATADAY) 0.2 % solution ophthalmic solution, Administer 1 drop to both eyes  Daily., Disp: 2.5 mL, Rfl: 3    Omega-3 Fatty Acids (fish oil) 1000 MG capsule capsule, Take 1 capsule by mouth Daily., Disp: , Rfl:     promethazine-dextromethorphan (PROMETHAZINE-DM) 6.25-15 MG/5ML syrup, Take 5 mL by mouth 3 (Three) Times a Day As Needed for Cough., Disp: 60 mL, Rfl: 0    Red Yeast Rice Extract 600 MG capsule, Take 1,200 mg by mouth Daily., Disp: , Rfl:     valsartan (DIOVAN) 160 MG tablet, Take 1 tablet by mouth Daily., Disp: 90 tablet, Rfl: 3    Allergies:   Allergies   Allergen Reactions    Claritin [Loratadine] Other (See Comments)     Depressed      Sudafed [Pseudoephedrine Hcl] Other (See Comments)     depressed    Sulfa Antibiotics GI Intolerance    Tobramycin Unknown (See Comments)       Objective     Physical Exam: Please see above  Vital Signs:   Vitals:    07/15/24 1011   BP: 130/78   BP Location: Left arm   Patient Position: Sitting   Cuff Size: Adult   Pulse: 87   Resp: 20   Temp: 97.8 °F (36.6 °C)   TempSrc: Temporal   SpO2: 97%   Weight: 97.1 kg (214 lb 2 oz)     Body mass index is 29.86 kg/m².    Physical Exam  Vitals reviewed.   Constitutional:       General: He is not in acute distress.     Appearance: Normal appearance. He is not ill-appearing or toxic-appearing.   HENT:      Head: Normocephalic and atraumatic.      Right Ear: External ear normal.      Left Ear: External ear normal.      Nose: Nose normal. Congestion and rhinorrhea present.      Mouth/Throat:      Mouth: Mucous membranes are moist.      Pharynx: Posterior oropharyngeal erythema present. No oropharyngeal exudate.      Comments: Post nasal drip  Eyes:      General: No scleral icterus.        Right eye: No discharge.         Left eye: No discharge.      Extraocular Movements: Extraocular movements intact.   Cardiovascular:      Rate and Rhythm: Normal rate and regular rhythm.      Pulses: Normal pulses.      Heart sounds: Normal heart sounds.   Pulmonary:      Effort: Pulmonary effort is normal. No respiratory  distress.      Breath sounds: Normal breath sounds. No stridor. No wheezing or rales.   Abdominal:      General: Abdomen is flat. There is no distension.   Musculoskeletal:      Cervical back: Normal range of motion. No rigidity or tenderness.   Lymphadenopathy:      Cervical: No cervical adenopathy.   Skin:     General: Skin is warm and dry.      Capillary Refill: Capillary refill takes less than 2 seconds.      Coloration: Skin is not jaundiced or pale.   Neurological:      General: No focal deficit present.      Mental Status: He is alert and oriented to person, place, and time. Mental status is at baseline.   Psychiatric:         Mood and Affect: Mood normal.         Behavior: Behavior normal.         Judgment: Judgment normal.       Physical Exam        Procedures    Results:   Labs:   Hemoglobin A1C   Date Value Ref Range Status   09/29/2023 5.50 4.80 - 5.60 % Final     TSH   Date Value Ref Range Status   11/27/2020 2.100 0.270 - 4.200 uIU/mL Final      Results  Laboratory Studies  Covid test was positive.      Imaging:   No valid procedures specified.     Assessment / Plan      Assessment/Plan:   Problem List Items Addressed This Visit    None  Visit Diagnoses       Sinus congestion    -  Primary    COVID                Assessment & Plan  1. COVID-19.  The patient's vital signs are within normal limits. The patient was advised to alternate between promethazine DM and OTC cold and flu medications (as they both have dextromethorphan) during the day and promethazine DM at night. The use of Afrin for a duration of 3 days was suggested. The use of Coricidin HBP was also suggested. The use of a sinus rinse with saline solution was also suggested. Recommend against use of paxlovid as he is not high risk, and does have an interaction with his red yeast rice. Following risk and benefit discussion, shared decision making was used to forego antiviral therapy. Counseled on red flag symptoms and indications to seek  further care. Counseled on supportive care, isolation and masking       Follow Up:   Return if symptoms worsen or fail to improve.    I have spent a total of 27 min on reviewing test results/preparing to see patient, counseling patient, performing medically appropriate exam and documenting clinical information in the electronic or other health record     Lucio Mesa MD  [facility]    Patient or patient representative verbalized consent for the use of Ambient Listening during the visit with  Lucio Mesa MD for chart documentation. 7/15/2024  10:55 EDT

## 2024-07-15 NOTE — PATIENT INSTRUCTIONS
Coricidin HBP    COVID Home Instructions  COVID Treatment Options  You may qualify for directed medical treatment of your COVID infection.  Call [phone] (option 2) to perform a tele-health appointment to determine if you meet criteria for treatment.  This treatment is mostly for high risk patients and criteria is based on your other health conditions.    Managing Coronavirus (Covid-19) Symptoms at home  Signs and Symptoms of Covid-19 may range from a mild case of sniffles to severe disease requiring hospitalization. The majority of patients will only experience a mild illness and recover well at home with some care. Treatment is aimed at symptom control - rest, fluid intake, fever reduction, cough suppression and pain relief.   Stay home and isolate yourself from family members. Use delivery services to have groceries and medications delivered or ask friends to drop-off groceries at your doorstep. You may want to have friends or family take your pets for a few days.   Monitor yourself: Take your temperature twice a day. If you own a pulse oximeter, make sure you know how to use it properly. Your pulse oximeter should read more than 92%.  Get plenty of rest, stay hydrated and use over the counter medications as necessary. Ask your doctor about the doses for these medications.     Hydration   Good hydration can alleviate many of the symptoms. Drink plenty of water or sports drinks, if you have a dry cough, a teaspoon of honey in hot water can help soothe your throat. If you have congestion, a warm, non-caffeinated beverage or warm shower can help loosen mucus.    WHO Oral Rehydration Solution:  3/8 tsp salt (sodium chloride)  1/4 tsp Gillespie Salt Substitute (potassium chloride)  1/2 tsp baking soda (sodium bicarbonate)  2 tablespoon + 2 teaspoon sugar  Add tap water to make 1 liter   Optional: May add NutraSweet or Splenda for flavor, Crystal light or sugar free Jello also works.   Do not use red or purple colored  Jello or Crystal light.     Over the counter medications:   Acetaminophen (Tylenol): reduces fever and pain - body aches, sore throat etc.  Guaifenesin (Mucinex) - expectorant, reduces chest congestion, helps bring up mucous.   Dextromethorphan (Mucinex-DM or Delsym) - cough suppressant  Afrin - nasal decongestant. Helps open up a stuffy nose. Be careful not to use this medication more than twice a day for no more than three days in a row, or your symptoms may get worse.     Prescription medications:   Sometimes you will need prescription medications to manage your symptoms. Here are some common ones that are used.   Ondansetron (Zofran) - used to prevent nausea or vomiting. It comes in a pill and an under the tongue formulation.   Promethazine (Phenergan) and Prochlorperazine (Compazine) - also used to treat nausea and vomiting. Both of these are available as tablets and suppositories  Benzonatate (Tessalon) - cough suppressant. It is important that you take this capsule whole and not crush or chew it.     How to avoid infecting other members of your household:  Avoid contact with members of your household, including pets.  Stay home from work, school and public areas unless it's to get medical care.  Avoid using public transportation, ride-sharing services or taxis.  Stay isolated in one room, away from your family and other people, as much as possible. This includes eating in your room. Open windows to keep air circulating. Use a separate bathroom, if possible.  Avoid shared spaces in your home as much as possible. When using shared spaces, limit your movements and wear a mask. Keep your kitchen and other shared spaces well ventilated. Stay at least 6 feet (2 meters) away from your family members.  Clean often-touched surfaces in your separate room and bathroom, such as doorknobs, light switches, electronics and counters, every day.  Avoid sharing personal household items such as dishes, towels, bedding and  electronics.  Wear a face mask when near others. Change the face mask each day.  If wearing a face mask isn't possible, cover your mouth and nose with a tissue or elbow when coughing or sneezing. Afterward, throw away the tissue or wash the handkerchief.  Frequently wash your hands with soap and water for at least 20 seconds, or use an alcohol-based hand  that contains at least 60% alcohol.    Signs that should warrant an evaluation at a higher level of care such as a local emergency room:  Trouble breathing when you are resting or walking short distances (such as from your bedroom to bathroom).  Your pulse oximeter reading is below 92% and does not improve with slow deep breathing and lying on your stomach.  A fever of 102 F(38.9 C) or higher that lasts for 24 hours and does not get better after you take acetaminophen (Tylenol).  Persistent chest pain or pressure.  Blood in your sputum.  A very bad headache that will not improve or go away with Tylenol.  New confusion.  Bluish lips or face.  Inability to stay awake.  Pale gray or blue-colored skin, lips or nail beds (depends on skin tone).    Isolation period: Isolation period depends on your symptoms, please ask your physician/physician assistant/nurse practitioner.      Note:   If you are usually on oxygen or have baseline pulse oximeter readings below 95%, please ask your physician/physician assistant/nurse practitioner what levels should be cause for alarm.     If you are immunosuppressed due to cancer, chemotherapy or use of certain medications, please discuss specific guidelines with your physician/physician assistant/nurse practitioner.      If you have heart or kidney failure or similar conditions please discuss your fluid intake with your physician/physician assistant/nurse practitioner.    If you are pregnant please discuss specific guidelines with your Family Physician or Obstetrician.

## 2024-07-18 ENCOUNTER — TELEPHONE (OUTPATIENT)
Dept: INTERNAL MEDICINE | Facility: CLINIC | Age: 44
End: 2024-07-18
Payer: COMMERCIAL

## 2024-07-18 NOTE — TELEPHONE ENCOUNTER
PATIENT HAS CALLED AND STATED HE HAS BEEN DEALING WITH COVID. PATIENT STATES HE IS MUCH BETTER BUT IS REQUESTING A CALL BACK ASAP TO ADVISE ON A MEDICATION HE CAN TAKE FOR THE COUGH AND POST NASAL DRIP. PATIENT STATES COUGH AND POST NASAL DRIP IS WORSE AT NIGHT AND HE IS HAVING TROUBLE SLEEPING. PATIENT WOULD LIKE A CALL BACK WITH SOME SUGGESTIONS ON WHAT HE CAN TAKE. PATIENT STATES MUCINEX HAD BEEN MENTIONED TO HIM BY OTHEERS AND WOULD LIKE SUGGESTIONS FROM HIS PCP.    CALL BACK NUMBER [phone]

## 2024-07-18 NOTE — TELEPHONE ENCOUNTER
Spoke to patient and notified of message. He stated the promethazine does help but with the cough. He wants to know what would help with chest congestion ( gunk in his terms). He stated at night he feels like he is going to choke. Wanted to know if mucinex would help.

## 2024-08-14 ENCOUNTER — OFFICE VISIT (OUTPATIENT)
Dept: INTERNAL MEDICINE | Facility: CLINIC | Age: 44
End: 2024-08-14
Payer: COMMERCIAL

## 2024-08-14 VITALS
OXYGEN SATURATION: 96 % | RESPIRATION RATE: 18 BRPM | TEMPERATURE: 97.7 F | HEART RATE: 105 BPM | WEIGHT: 220 LBS | DIASTOLIC BLOOD PRESSURE: 86 MMHG | BODY MASS INDEX: 30.68 KG/M2 | SYSTOLIC BLOOD PRESSURE: 138 MMHG

## 2024-08-14 DIAGNOSIS — R09.89 LABILE HYPERTENSION: Primary | ICD-10-CM

## 2024-08-14 DIAGNOSIS — R05.9 COUGH, UNSPECIFIED TYPE: ICD-10-CM

## 2024-08-14 DIAGNOSIS — K21.9 GASTROESOPHAGEAL REFLUX DISEASE, UNSPECIFIED WHETHER ESOPHAGITIS PRESENT: ICD-10-CM

## 2024-08-14 PROCEDURE — 99214 OFFICE O/P EST MOD 30 MIN: CPT | Performed by: STUDENT IN AN ORGANIZED HEALTH CARE EDUCATION/TRAINING PROGRAM

## 2024-08-14 RX ORDER — OMEPRAZOLE 40 MG/1
40 CAPSULE, DELAYED RELEASE ORAL DAILY
Qty: 42 CAPSULE | Refills: 0 | Status: SHIPPED | OUTPATIENT
Start: 2024-08-14

## 2024-08-14 NOTE — PROGRESS NOTES
Follow Up Office Visit      Date: 2024   Patient Name: Julian Willoughby  : 1980   MRN: [id number]     Chief Complaint:    Chief Complaint   Patient presents with    Cough     Post covid   Hx of Gerd       History of Present Illness: Julian Willoughby is a 43 y.o. male who is here today for cough.    HPI  History of Present Illness  The patient presents for evaluation of a cough.    He experienced symptoms of COVID-19 for approximately 5 days, followed by a recovery period of similar duration. During this time, he developed a persistent cough and significant postnasal drip. He found relief from these symptoms with Mucinex. However, he has noticed a change in his cough, which he suspects may be related to his pre-existing Gastroesophageal Reflux Disease (GERD). The cough is most noticeable at night, particularly after eating and when lying down. He recalls a previous episode where a similar cough was alleviated with famotidine. He continued to take famotidine during his bout with COVID-19. He is still taking famotidine but feels it is not as effective as prior. He notes he has been eating later and laying down afterwards.    He reports fluctuations in his weight, which he believes may be contributing to his GERD symptoms. He has not been monitoring his blood pressure at home but plans to start doing so. He does not report any productive cough. He has resumed taking supplements in the past week and intends to continue this regimen.    He is vigilant for signs of long COVID-19 and denies experiencing any shortness of breath or brain fog. He has an upcoming colonoscopy scheduled for 2024.    He continues to take valsartan 160mg daily. Previously was also on amlodipine.    Subjective      Review of Systems:   Review of Systems    I have reviewed the patients family history, social history, past medical history, past surgical history and have updated it as appropriate.     Medications:      Current Outpatient Medications:     famotidine (PEPCID) 20 MG tablet, Take 1 tablet by mouth Daily., Disp: 30 tablet, Rfl: 11    fluticasone (FLONASE) 50 MCG/ACT nasal spray, 1 spray into the nostril(s) as directed by provider Daily., Disp: 16 g, Rfl: 3    montelukast (SINGULAIR) 10 MG tablet, TAKE 1 TABLET EVERY NIGHT, Disp: 90 tablet, Rfl: 3    Multiple Vitamin (MULTI-VITAMIN DAILY PO), Take  by mouth As Needed., Disp: , Rfl:     olopatadine (PATADAY) 0.2 % solution ophthalmic solution, Administer 1 drop to both eyes Daily., Disp: 2.5 mL, Rfl: 3    Omega-3 Fatty Acids (fish oil) 1000 MG capsule capsule, Take 1 capsule by mouth Daily., Disp: , Rfl:     Red Yeast Rice Extract 600 MG capsule, Take 1,200 mg by mouth Daily., Disp: , Rfl:     valsartan (DIOVAN) 160 MG tablet, Take 1 tablet by mouth Daily., Disp: 90 tablet, Rfl: 3    omeprazole (priLOSEC) 40 MG capsule, Take 1 capsule by mouth Daily., Disp: 42 capsule, Rfl: 0    Allergies:   Allergies   Allergen Reactions    Claritin [Loratadine] Other (See Comments)     Depressed      Sudafed [Pseudoephedrine Hcl] Other (See Comments)     depressed    Sulfa Antibiotics GI Intolerance    Tobramycin Unknown (See Comments)       Objective     Physical Exam: Please see above  Vital Signs:   Vitals:    08/14/24 0949 08/14/24 1017   BP: 142/88 138/86   BP Location: Right arm Right arm   Patient Position: Sitting Sitting   Cuff Size: Adult Adult   Pulse: 105    Resp: 18    Temp: 97.7 °F (36.5 °C)    TempSrc: Temporal    SpO2: 96%    Weight: 99.8 kg (220 lb)    PainSc: 0-No pain      Body mass index is 30.68 kg/m².    Physical Exam  Vitals reviewed.   Constitutional:       General: He is not in acute distress.     Appearance: Normal appearance. He is obese. He is not ill-appearing or toxic-appearing.   HENT:      Head: Normocephalic and atraumatic.      Right Ear: External ear normal.      Left Ear: External ear normal.      Nose: Nose normal. No congestion.       Mouth/Throat:      Mouth: Mucous membranes are moist.   Eyes:      General: No scleral icterus.        Right eye: No discharge.         Left eye: No discharge.      Extraocular Movements: Extraocular movements intact.   Cardiovascular:      Rate and Rhythm: Normal rate and regular rhythm.      Pulses: Normal pulses.      Heart sounds: Normal heart sounds.   Pulmonary:      Effort: Pulmonary effort is normal. No respiratory distress.      Breath sounds: Normal breath sounds. No stridor. No wheezing, rhonchi or rales.   Abdominal:      General: Abdomen is flat. There is no distension.      Palpations: Abdomen is soft.   Musculoskeletal:      Cervical back: Normal range of motion. No rigidity.   Skin:     General: Skin is warm and dry.      Capillary Refill: Capillary refill takes less than 2 seconds.      Coloration: Skin is not jaundiced or pale.   Neurological:      General: No focal deficit present.      Mental Status: He is alert and oriented to person, place, and time. Mental status is at baseline.   Psychiatric:         Mood and Affect: Mood normal.         Behavior: Behavior normal.         Judgment: Judgment normal.       Physical Exam  Lungs sound normal. No wheezing or crackles.    Vital Signs  Blood pressure is 138/86.      Procedures    Results:   Labs:   Hemoglobin A1C   Date Value Ref Range Status   09/29/2023 5.50 4.80 - 5.60 % Final     TSH   Date Value Ref Range Status   11/27/2020 2.100 0.270 - 4.200 uIU/mL Final      Results        Imaging:   No valid procedures specified.     Assessment / Plan      Assessment/Plan:   Problem List Items Addressed This Visit       Labile hypertension - Primary    Overview     Follows with Dr. Foster of cardiology. Last seen 8/26/2022.  Previously completed 24 ambulatory blood pressure monitoring with normal serum catecholamines as well as aldosterone to renin ratio.          Other Visit Diagnoses       Cough, unspecified type        Relevant Medications    omeprazole  (priLOSEC) 40 MG capsule    Gastroesophageal reflux disease, unspecified whether esophagitis present        Relevant Medications    omeprazole (priLOSEC) 40 MG capsule            Assessment & Plan  1. Cough.  2. GERD  Chronic, worsening  The cough may be related to GERD, which can vary in severity. His oxygen level is within the normal range, and there are no signs of wheezing or crackles making pulmonary cause less likely. He was advised to switch from famotidine to Prilosec 40 mg once daily, taken around breakfast for 6 weeks, and then revert to Pepcid. Weight loss and remaining upright for at least 30 minutes after eating were recommended to alleviate GERD symptoms.    2. Hypertension.  Chronic, worsening  His blood pressure was slightly elevated at 138/86. He was advised to monitor his blood pressure at home and maintain a log. If his blood pressure remains high, we will consider resuming taking amlodipine. He was also advised to continue taking his supplements regularly.    3. Health Maintenance.  A screening colonoscopy has been scheduled for 09/30/2024.         Follow Up:   Return in about 7 weeks (around 10/4/2024) for Annual, Fasting as scheduled.      Lucio Mesa MD  [facility]    Patient or patient representative verbalized consent for the use of Ambient Listening during the visit with  Lucio Mesa MD for chart documentation. 8/14/2024  10:28 EDT

## 2024-10-04 ENCOUNTER — APPOINTMENT (OUTPATIENT)
Dept: LAB | Facility: HOSPITAL | Age: 44
End: 2024-10-04
Payer: COMMERCIAL

## 2024-10-04 ENCOUNTER — LAB (OUTPATIENT)
Dept: LAB | Facility: HOSPITAL | Age: 44
End: 2024-10-04
Payer: COMMERCIAL

## 2024-10-04 ENCOUNTER — OFFICE VISIT (OUTPATIENT)
Dept: INTERNAL MEDICINE | Facility: CLINIC | Age: 44
End: 2024-10-04
Payer: COMMERCIAL

## 2024-10-04 VITALS
RESPIRATION RATE: 18 BRPM | BODY MASS INDEX: 30.31 KG/M2 | SYSTOLIC BLOOD PRESSURE: 128 MMHG | HEART RATE: 69 BPM | WEIGHT: 216.5 LBS | TEMPERATURE: 97.7 F | HEIGHT: 71 IN | OXYGEN SATURATION: 95 % | DIASTOLIC BLOOD PRESSURE: 80 MMHG

## 2024-10-04 DIAGNOSIS — E78.2 MIXED HYPERLIPIDEMIA: ICD-10-CM

## 2024-10-04 DIAGNOSIS — K21.9 GASTROESOPHAGEAL REFLUX DISEASE, UNSPECIFIED WHETHER ESOPHAGITIS PRESENT: ICD-10-CM

## 2024-10-04 DIAGNOSIS — Z13.6 SCREENING FOR ISCHEMIC HEART DISEASE: ICD-10-CM

## 2024-10-04 DIAGNOSIS — R09.89 LABILE HYPERTENSION: ICD-10-CM

## 2024-10-04 DIAGNOSIS — K59.00 CONSTIPATION, UNSPECIFIED CONSTIPATION TYPE: ICD-10-CM

## 2024-10-04 DIAGNOSIS — Z00.00 ROUTINE HEALTH MAINTENANCE: Primary | ICD-10-CM

## 2024-10-04 DIAGNOSIS — E66.811 OBESITY (BMI 30.0-34.9): ICD-10-CM

## 2024-10-04 LAB
ALBUMIN SERPL-MCNC: 4.5 G/DL (ref 3.5–5.2)
ALBUMIN/GLOB SERPL: 1.5 G/DL
ALP SERPL-CCNC: 93 U/L (ref 39–117)
ALT SERPL W P-5'-P-CCNC: 60 U/L (ref 1–41)
ANION GAP SERPL CALCULATED.3IONS-SCNC: 13 MMOL/L (ref 5–15)
AST SERPL-CCNC: 33 U/L (ref 1–40)
BASOPHILS # BLD AUTO: 0.05 10*3/MM3 (ref 0–0.2)
BASOPHILS NFR BLD AUTO: 0.9 % (ref 0–1.5)
BILIRUB SERPL-MCNC: 0.6 MG/DL (ref 0–1.2)
BUN SERPL-MCNC: 14 MG/DL (ref 6–20)
BUN/CREAT SERPL: 14.1 (ref 7–25)
CALCIUM SPEC-SCNC: 9.7 MG/DL (ref 8.6–10.5)
CHLORIDE SERPL-SCNC: 100 MMOL/L (ref 98–107)
CHOLEST SERPL-MCNC: 161 MG/DL (ref 0–200)
CO2 SERPL-SCNC: 24 MMOL/L (ref 22–29)
CREAT SERPL-MCNC: 0.99 MG/DL (ref 0.76–1.27)
DEPRECATED RDW RBC AUTO: 41.8 FL (ref 37–54)
EGFRCR SERPLBLD CKD-EPI 2021: 95.7 ML/MIN/1.73
EOSINOPHIL # BLD AUTO: 0.05 10*3/MM3 (ref 0–0.4)
EOSINOPHIL NFR BLD AUTO: 0.9 % (ref 0.3–6.2)
ERYTHROCYTE [DISTWIDTH] IN BLOOD BY AUTOMATED COUNT: 13 % (ref 12.3–15.4)
GLOBULIN UR ELPH-MCNC: 3.1 GM/DL
GLUCOSE SERPL-MCNC: 75 MG/DL (ref 65–99)
HBA1C MFR BLD: 5.4 % (ref 4.8–5.6)
HCT VFR BLD AUTO: 51.8 % (ref 37.5–51)
HDLC SERPL-MCNC: 39 MG/DL (ref 40–60)
HGB BLD-MCNC: 17.6 G/DL (ref 13–17.7)
IMM GRANULOCYTES # BLD AUTO: 0.02 10*3/MM3 (ref 0–0.05)
IMM GRANULOCYTES NFR BLD AUTO: 0.3 % (ref 0–0.5)
LDLC SERPL CALC-MCNC: 94 MG/DL (ref 0–100)
LDLC/HDLC SERPL: 2.32 {RATIO}
LYMPHOCYTES # BLD AUTO: 1.36 10*3/MM3 (ref 0.7–3.1)
LYMPHOCYTES NFR BLD AUTO: 23.4 % (ref 19.6–45.3)
MCH RBC QN AUTO: 30.2 PG (ref 26.6–33)
MCHC RBC AUTO-ENTMCNC: 34 G/DL (ref 31.5–35.7)
MCV RBC AUTO: 89 FL (ref 79–97)
MONOCYTES # BLD AUTO: 0.54 10*3/MM3 (ref 0.1–0.9)
MONOCYTES NFR BLD AUTO: 9.3 % (ref 5–12)
NEUTROPHILS NFR BLD AUTO: 3.8 10*3/MM3 (ref 1.7–7)
NEUTROPHILS NFR BLD AUTO: 65.2 % (ref 42.7–76)
NRBC BLD AUTO-RTO: 0 /100 WBC (ref 0–0.2)
PLATELET # BLD AUTO: 361 10*3/MM3 (ref 140–450)
PMV BLD AUTO: 10.6 FL (ref 6–12)
POTASSIUM SERPL-SCNC: 4.5 MMOL/L (ref 3.5–5.2)
PROT SERPL-MCNC: 7.6 G/DL (ref 6–8.5)
RBC # BLD AUTO: 5.82 10*6/MM3 (ref 4.14–5.8)
SODIUM SERPL-SCNC: 137 MMOL/L (ref 136–145)
TRIGL SERPL-MCNC: 158 MG/DL (ref 0–150)
TSH SERPL DL<=0.05 MIU/L-ACNC: 1.53 UIU/ML (ref 0.27–4.2)
VLDLC SERPL-MCNC: 28 MG/DL (ref 5–40)
WBC NRBC COR # BLD AUTO: 5.82 10*3/MM3 (ref 3.4–10.8)

## 2024-10-04 PROCEDURE — 80050 GENERAL HEALTH PANEL: CPT

## 2024-10-04 PROCEDURE — 80061 LIPID PANEL: CPT

## 2024-10-04 PROCEDURE — 83036 HEMOGLOBIN GLYCOSYLATED A1C: CPT

## 2024-10-04 NOTE — ASSESSMENT & PLAN NOTE
Chronic, unchanged. Recommend healthy diet and exercise. Continue red yeast rice and fish oil. Repeat fasting lab

## 2024-10-04 NOTE — PROGRESS NOTES
Male Physical Note      Date: 10/04/2024   Patient Name: Julian Willoughby  : 1980   MRN: [id number]     Chief Complaint:    Chief Complaint   Patient presents with    Annual Exam       History of Present Illness: Julian Willoughby is a 44 y.o. male who is here today for their annual health maintenance and physical.    HPI  History of Present Illness  The patient presents for evaluation of multiple medical concerns.    He has been managing grade 1 internal hemorrhoids with a high-fiber diet, including multigrain cereal in the morning. This dietary change has been beneficial, and he has not experienced any issues with the hemorrhoids. He is curious about whether these hemorrhoids will ever completely resolve.    He recently underwent a colonoscopy, the results of which were consistent with his previous one.    He has been managing a cough with Prilosec but recently switched back to famotidine after a 6-week course. His cough and GERD have improved.     He has been monitoring his blood pressure at home and has noticed slightly elevated diastolic readings, with a recent reading of 122/87. He takes his blood pressure medication, [id number] mg, in the morning. He has also noticed weight gain and believes that controlling his weight could help manage his blood pressure. He recalls that his blood pressure was perfect when his weight was lower. He is considering incorporating cardio exercises into his routine, as he finds it difficult to adhere to a strict diet. He has previously lost weight through interval training, dropping from around 205 pounds to 187 pounds in just 2.5 weeks.    He has a hiatal hernia.    SOCIAL HISTORY  He does not smoke.    IMMUNIZATIONS  He has received his influenza vaccine. He has received his COVID-19 vaccine.        Subjective      Review of Systems:   Review of Systems    Past Medical History, Social History, Family History and Care Team were all reviewed with patient and  updated as appropriate.     Medications:     Current Outpatient Medications:     famotidine (PEPCID) 20 MG tablet, Take 1 tablet by mouth Daily., Disp: 30 tablet, Rfl: 11    fluticasone (FLONASE) 50 MCG/ACT nasal spray, 1 spray into the nostril(s) as directed by provider Daily., Disp: 16 g, Rfl: 3    montelukast (SINGULAIR) 10 MG tablet, TAKE 1 TABLET EVERY NIGHT, Disp: 90 tablet, Rfl: 3    Multiple Vitamin (MULTI-VITAMIN DAILY PO), Take  by mouth As Needed., Disp: , Rfl:     olopatadine (PATADAY) 0.2 % solution ophthalmic solution, Administer 1 drop to both eyes Daily., Disp: 2.5 mL, Rfl: 3    Omega-3 Fatty Acids (fish oil) 1000 MG capsule capsule, Take 1 capsule by mouth Daily., Disp: , Rfl:     omeprazole (priLOSEC) 40 MG capsule, Take 1 capsule by mouth Daily., Disp: 42 capsule, Rfl: 0    Red Yeast Rice Extract 600 MG capsule, Take 1,200 mg by mouth Daily., Disp: , Rfl:     valsartan (DIOVAN) 160 MG tablet, Take 1 tablet by mouth Daily., Disp: 90 tablet, Rfl: 3    Allergies:   Allergies   Allergen Reactions    Claritin [Loratadine] Other (See Comments)     Depressed      Sudafed [Pseudoephedrine Hcl] Other (See Comments)     depressed    Sulfa Antibiotics GI Intolerance    Tobramycin Unknown (See Comments)       Immunizations:  Td/Tdap(Booster Q 10 yrs):  UTD  Flu (Yearly):  UTD  Pneumonia: NA  Immunization History   Administered Date(s) Administered    COVID-19 (PFIZER) 12YRS+ (COMIRNATY) 10/11/2023, 09/24/2024    COVID-19 (PFIZER) BIVALENT 12+YRS 09/21/2022    COVID-19 (PFIZER) Purple Cap Monovalent 01/12/2021, 02/03/2021, 10/04/2021    Fluzone  >6mos 09/18/2024    Fluzone (or Fluarix & Flulaval for VFC) >6mos 10/05/2022, 09/25/2023    Hepatitis A 08/29/2018, 03/26/2019    Influenza Seasonal Injectable 09/26/2021    Influenza, Unspecified 10/13/2015, 10/13/2016, 09/26/2017, 09/11/2018, 10/23/2019, 09/08/2020, 10/05/2022    MMR 03/16/1981, 07/12/1990    PPD Test 01/29/2002, 03/11/2003, 03/25/2003     "Pneumococcal Polysaccharide (PPSV23) 01/18/2022    Tdap 10/23/2019        Colorectal Screening:   UTD   Last Completed Colonoscopy            COLORECTAL CANCER SCREENING (COLONOSCOPY - Every 5 Years) Next due on 9/30/2029 09/30/2024  Colonoscopy, Scan    07/12/2019  COLONOSCOPY (Patient-Reported (Performed Externally))                    Hep C (Age 18-79 once):  NR  HIV (Age 15-65 once) : NR        A1c: due  Lipid panel: due  The 10-year ASCVD risk score (Margarita MEDEL, et al., 2019) is: 2.7%    Values used to calculate the score:      Age: 44 years      Sex: Male      Is Non- : No      Diabetic: No      Tobacco smoker: No      Systolic Blood Pressure: 128 mmHg      Is BP treated: Yes      HDL Cholesterol: 41 mg/dL      Total Cholesterol: 188 mg/dL      Ophthalmologist: recommend annual  Dentist: recommend routine    Tobacco Use: Low Risk  (10/4/2024)    Patient History     Smoking Tobacco Use: Never     Smokeless Tobacco Use: Never     Passive Exposure: Never       Social History     Substance and Sexual Activity   Alcohol Use No        Social History     Substance and Sexual Activity   Drug Use No        Diet/Physical activity:   Counseled extensively on diet and exercise      PHQ-2 Depression Screening  PHQ-9 Total Score: 0           Objective     Physical Exam:  Vital Signs:   Vitals:    10/04/24 1351   BP: 128/80   BP Location: Left arm   Patient Position: Sitting   Cuff Size: Adult   Pulse: 69   Resp: 18   Temp: 97.7 °F (36.5 °C)   TempSrc: Temporal   SpO2: 95%   Weight: 98.2 kg (216 lb 8 oz)   Height: 180 cm (70.87\")   PainSc: 0-No pain     Body mass index is 30.31 kg/m².     Physical Exam  Vitals reviewed.   Constitutional:       General: He is not in acute distress.     Appearance: Normal appearance. He is obese. He is not ill-appearing or toxic-appearing.   HENT:      Head: Normocephalic and atraumatic.      Right Ear: External ear normal.      Left Ear: External ear normal.      " Nose: No congestion.      Mouth/Throat:      Mouth: Mucous membranes are moist.      Pharynx: No oropharyngeal exudate or posterior oropharyngeal erythema.   Eyes:      General: No scleral icterus.        Right eye: No discharge.         Left eye: No discharge.      Extraocular Movements: Extraocular movements intact.   Cardiovascular:      Rate and Rhythm: Normal rate and regular rhythm.      Pulses: Normal pulses.      Heart sounds: Normal heart sounds.   Pulmonary:      Effort: Pulmonary effort is normal. No respiratory distress.      Breath sounds: Normal breath sounds. No stridor. No wheezing, rhonchi or rales.   Abdominal:      General: Abdomen is flat. Bowel sounds are normal. There is no distension.      Palpations: Abdomen is soft. There is no mass.      Tenderness: There is no abdominal tenderness. There is no guarding or rebound.   Musculoskeletal:         General: No swelling. Normal range of motion.      Cervical back: Normal range of motion and neck supple. No rigidity or tenderness.   Lymphadenopathy:      Cervical: No cervical adenopathy.   Skin:     General: Skin is warm and dry.      Capillary Refill: Capillary refill takes less than 2 seconds.      Coloration: Skin is not jaundiced or pale.   Neurological:      General: No focal deficit present.      Mental Status: He is alert and oriented to person, place, and time. Mental status is at baseline.   Psychiatric:         Mood and Affect: Mood normal.         Behavior: Behavior normal.         Judgment: Judgment normal.       Physical Exam        Procedures  Results        Assessment / Plan      Assessment/Plan:   Problem List Items Addressed This Visit       Mixed hyperlipidemia    Current Assessment & Plan     Chronic, unchanged. Recommend healthy diet and exercise. Continue red yeast rice and fish oil. Repeat fasting lab         Labile hypertension    Overview     Follows with Dr. Cazares of cardiology.  - Previously completed 24 ambulatory blood  pressure monitoring with normal serum catecholamines as well as aldosterone to renin ratio.         Current Assessment & Plan     Hypertension is improving with treatment.  Continue current treatment regimen.  Blood pressure will be reassessed at the next regular appointment.          Other Visit Diagnoses       Routine health maintenance    -  Primary    Screening for ischemic heart disease        Gastroesophageal reflux disease, unspecified whether esophagitis present              Assessment & Plan  1. Internal Hemorrhoids.  The patient has grade 1 internal hemorrhoids, likely due to constipation. He has been following a high fiber diet, which has been effective in managing symptoms. It was recommended to continue this diet to prevent flare-ups and avoid constipation.        2. Gastroesophageal Reflux Disease (GERD).  He was advised to continue with famotidine as needed. The potential for GERD symptoms to worsen with illnesses that cause coughing was discussed.    4. Hypertension.  The patient's blood pressure readings have been slightly elevated. He was advised to continue taking valsartan and to monitor his blood pressure regularly. Weight loss was suggested as a method to reduce blood pressure, with a target weight of 195 pounds over a year or longer. Cardio exercises were recommended, and the use of the free ruel, Yapp, to track caloric intake was suggested. Lab tests were ordered.    5. Health Maintenance.  The patient has already received his flu vaccine. He was advised to ensure his family members also get vaccinated. Routine colonoscopy results were normal, and he was advised to continue regular screenings every 5 years.        Healthcare Maintenance:  Counseling provided based on age appropriate USPSTF guidelines.  BMI is >= 30 and <35. (Class 1 Obesity). The following options were offered after discussion;: exercise counseling/recommendations and nutrition counseling/recommendations    Julian Mohamud  Case voices understanding and acceptance of this advice and will call back with any further questions or concerns. AVS with preventive healthcare tips printed for patient.         Follow Up:   Return in about 6 months (around 4/4/2025) for Recheck blood pressure, gerd.    Lucio Mesa MD   [facility]    Patient or patient representative verbalized consent for the use of Ambient Listening during the visit with  Lucio Mesa MD for chart documentation. 10/4/2024  14:51 EDT

## 2024-10-04 NOTE — PATIENT INSTRUCTIONS
Try the [employer]    Health Maintenance, Male  A healthy lifestyle and preventive care is important for your health and wellness. Ask your health care provider about what schedule of regular examinations is right for you.  What should I know about weight and diet?    Eat a Healthy Diet  Eat plenty of vegetables, fruits, whole grains, low-fat dairy products, and lean protein.  Do not eat a lot of foods high in solid fats, added sugars, or salt.     Maintain a Healthy Weight  Regular exercise can help you achieve or maintain a healthy weight. You should:  Do at least 150 minutes of exercise each week. The exercise should increase your heart rate and make you sweat (moderate-intensity exercise).  Do strength-training exercises at least twice a week.     Watch Your Levels of Cholesterol and Blood Lipids  Have your blood tested for lipids and cholesterol every 5 years starting at 35 years of age. If you are at high risk for heart disease, you should start having your blood tested when you are 20 years old. You may need to have your cholesterol levels checked more often if:  Your lipid or cholesterol levels are high.  You are older than 50 years of age.  You are at high risk for heart disease.     What should I know about cancer screening?  Many types of cancers can be detected early and may often be prevented.  Lung Cancer  You should be screened every year for lung cancer if:  You are a current smoker who has smoked for at least 30 years.  You are a former smoker who has quit within the past 15 years.  Talk to your health care provider about your screening options, when you should start screening, and how often you should be screened.     Colorectal Cancer  Routine colorectal cancer screening usually begins at 50 years of age and should be repeated every 5-10 years until you are 75 years old. You may need to be screened more often if early forms of precancerous polyps or small growths are found. Your health care  provider may recommend screening at an earlier age if you have risk factors for colon cancer.  Your health care provider may recommend using home test kits to check for hidden blood in the stool.  A small camera at the end of a tube can be used to examine your colon (sigmoidoscopy or colonoscopy). This checks for the earliest forms of colorectal cancer.     Prostate and Testicular Cancer  Depending on your age and overall health, your health care provider may do certain tests to screen for prostate and testicular cancer.  Talk to your health care provider about any symptoms or concerns you have about testicular or prostate cancer.     Skin Cancer  Check your skin from head to toe regularly.  Tell your health care provider about any new moles or changes in moles, especially if:  There is a change in a mole’s size, shape, or color.  You have a mole that is larger than a pencil eraser.  Always use sunscreen. Apply sunscreen liberally and repeat throughout the day.  Protect yourself by wearing long sleeves, pants, a wide-brimmed hat, and sunglasses when outside.     What should I know about heart disease, diabetes, and high blood pressure?  If you are 18-39 years of age, have your blood pressure checked every 3-5 years. If you are 40 years of age or older, have your blood pressure checked every year. You should have your blood pressure measured twice--once when you are at a hospital or clinic, and once when you are not at a hospital or clinic. Record the average of the two measurements. To check your blood pressure when you are not at a hospital or clinic, you can use:  An automated blood pressure machine at a pharmacy.  A home blood pressure monitor.  Talk to your health care provider about your target blood pressure.  If you are between 45-79 years old, ask your health care provider if you should take aspirin to prevent heart disease.  Have regular diabetes screenings by checking your fasting blood sugar level.  If  you are at a normal weight and have a low risk for diabetes, have this test once every three years after the age of 45.  If you are overweight and have a high risk for diabetes, consider being tested at a younger age or more often.  A one-time screening for abdominal aortic aneurysm (AAA) by ultrasound is recommended for men aged 65-75 years who are current or former smokers.  What should I know about preventing infection?  Hepatitis B  If you have a higher risk for hepatitis B, you should be screened for this virus. Talk with your health care provider to find out if you are at risk for hepatitis B infection.  Hepatitis C  Blood testing is recommended for:  Everyone born from 1945 through 1965.  Anyone with known risk factors for hepatitis C.     Sexually Transmitted Diseases (STDs)  You should be screened each year for STDs including gonorrhea and chlamydia if:  You are sexually active and are younger than 24 years of age.  You are older than 24 years of age and your health care provider tells you that you are at risk for this type of infection.  Your sexual activity has changed since you were last screened and you are at an increased risk for chlamydia or gonorrhea. Ask your health care provider if you are at risk.  Talk with your health care provider about whether you are at high risk of being infected with HIV. Your health care provider may recommend a prescription medicine to help prevent HIV infection.     What else can I do?    Schedule regular health, dental, and eye exams.  Stay current with your vaccines (immunizations).  Do not use any tobacco products, such as cigarettes, chewing tobacco, and e-cigarettes. If you need help quitting, ask your health care provider.  Limit alcohol intake to no more than 2 drinks per day. One drink equals 12 ounces of beer, 5 ounces of wine, or 1½ ounces of hard liquor.  Do not use street drugs.  Do not share needles.  Ask your health care provider for help if you need  support or information about quitting drugs.  Tell your health care provider if you often feel depressed.  Tell your health care provider if you have ever been abused or do not feel safe at home.      This information is not intended to replace advice given to you by your health care provider. Make sure you discuss any questions you have with your health care provider.  Document Released: 06/15/2009 Document Revised: 08/16/2017 Document Reviewed: 09/20/2016  [employer] Interactive Patient Education © 2018 [employer]    Healthy Eating  Following a healthy eating pattern may help you to achieve and maintain a healthy body weight, reduce the risk of chronic disease, and live a long and productive life. It is important to follow a healthy eating pattern at an appropriate calorie level for your body. Your nutritional needs should be met primarily through food by choosing a variety of nutrient-rich foods.  What are tips for following this plan?  Reading food labels  Read labels and choose the following:  Reduced or low sodium.  Juices with 100% fruit juice.  Foods with low saturated fats and high polyunsaturated and monounsaturated fats.  Foods with whole grains, such as whole wheat, cracked wheat, brown rice, and wild rice.  Whole grains that are fortified with folic acid. This is recommended for women who are pregnant or who want to become pregnant.  Read labels and avoid the following:  Foods with a lot of added sugars. These include foods that contain brown sugar, corn sweetener, corn syrup, dextrose, fructose, glucose, high-fructose corn syrup, honey, invert sugar, lactose, malt syrup, maltose, molasses, raw sugar, sucrose, trehalose, or turbinado sugar.  Do not eat more than the following amounts of added sugar per day:  6 teaspoons (25 g) for women.  9 teaspoons (38 g) for men.  Foods that contain processed or refined starches and grains.  Refined grain products, such as white flour, degermed cornmeal, white  "bread, and white rice.  Shopping  Choose nutrient-rich snacks, such as vegetables, whole fruits, and nuts. Avoid high-calorie and high-sugar snacks, such as potato chips, fruit snacks, and candy.  Use oil-based dressings and spreads on foods instead of solid fats such as butter, stick margarine, or cream cheese.  Limit pre-made sauces, mixes, and \"instant\" products such as flavored rice, instant noodles, and ready-made pasta.  Try more plant-protein sources, such as tofu, tempeh, black beans, edamame, lentils, nuts, and seeds.  Explore eating plans such as the Mediterranean diet or vegetarian diet.  Cooking  Use oil to sauté or stir-abraham foods instead of solid fats such as butter, stick margarine, or lard.  Try baking, boiling, grilling, or broiling instead of frying.  Remove the fatty part of meats before cooking.  Steam vegetables in water or broth.  Meal planning    At meals, imagine dividing your plate into fourths:  One-half of your plate is fruits and vegetables.  One-fourth of your plate is whole grains.  One-fourth of your plate is protein, especially lean meats, poultry, eggs, tofu, beans, or nuts.  Include low-fat dairy as part of your daily diet.     Lifestyle  Choose healthy options in all settings, including home, work, school, restaurants, or stores.  Prepare your food safely:  Wash your hands after handling raw meats.  Keep food preparation surfaces clean by regularly washing with hot, soapy water.  Keep raw meats separate from ready-to-eat foods, such as fruits and vegetables.  , meat, poultry, and eggs to the recommended internal temperature.  Store foods at safe temperatures. In general:  Keep cold foods at 40°F (4.4°C) or below.  Keep hot foods at 140°F (60°C) or above.  Keep your freezer at 0°F (-17.8°C) or below.  Foods are no longer safe to eat when they have been between the temperatures of 40°-140°F (4.4-60°C) for more than 2 hours.  What foods should I eat?  Fruits  Aim to eat 2 " cup-equivalents of fresh, canned (in natural juice), or frozen fruits each day. Examples of 1 cup-equivalent of fruit include 1 small apple, 8 large strawberries, 1 cup canned fruit, ½ cup dried fruit, or 1 cup 100% juice.  Vegetables  Aim to eat 2½-3 cup-equivalents of fresh and frozen vegetables each day, including different varieties and colors. Examples of 1 cup-equivalent of vegetables include 2 medium carrots, 2 cups raw, leafy greens, 1 cup chopped vegetable (raw or cooked), or 1 medium baked potato.  Grains  Aim to eat 6 ounce-equivalents of whole grains each day. Examples of 1 ounce-equivalent of grains include 1 slice of bread, 1 cup ready-to-eat cereal, 3 cups popcorn, or ½ cup cooked rice, pasta, or cereal.  Meats and other proteins  Aim to eat 5-6 ounce-equivalents of protein each day. Examples of 1 ounce-equivalent of protein include 1 egg, 1/2 cup nuts or seeds, or 1 tablespoon (16 g) peanut butter. A cut of meat or fish that is the size of a deck of cards is about 3-4 ounce-equivalents.  Of the protein you eat each week, try to have at least 8 ounces come from seafood. This includes salmon, trout, herring, and anchovies.  Dairy  Aim to eat 3 cup-equivalents of fat-free or low-fat dairy each day. Examples of 1 cup-equivalent of dairy include 1 cup (240 mL) milk, 8 ounces (250 g) yogurt, 1½ ounces (44 g) natural cheese, or 1 cup (240 mL) fortified soy milk.  Fats and oils  Aim for about 5 teaspoons (21 g) per day. Choose monounsaturated fats, such as canola and olive oils, avocados, peanut butter, and most nuts, or polyunsaturated fats, such as sunflower, corn, and soybean oils, walnuts, pine nuts, sesame seeds, sunflower seeds, and flaxseed.  Beverages  Aim for six 8-oz glasses of water per day. Limit coffee to three to five 8-oz cups per day.  Limit caffeinated beverages that have added calories, such as soda and energy drinks.  Limit alcohol intake to no more than 1 drink a day for nonpregnant women  and 2 drinks a day for men. One drink equals 12 oz of beer (355 mL), 5 oz of wine (148 mL), or 1½ oz of hard liquor (44 mL).  Seasoning and other foods  Avoid adding excess amounts of salt to your foods. Try flavoring foods with herbs and spices instead of salt.  Avoid adding sugar to foods.  Try using oil-based dressings, sauces, and spreads instead of solid fats.  This information is based on general [place] nutrition guidelines. For more information, visit choosemyplate.gov. Exact amounts may vary based on your nutrition needs.  Summary  A healthy eating plan may help you to maintain a healthy weight, reduce the risk of chronic diseases, and stay active throughout your life.  Plan your meals. Make sure you eat the right portions of a variety of nutrient-rich foods.  Try baking, boiling, grilling, or broiling instead of frying.  Choose healthy options in all settings, including home, work, school, restaurants, or stores.  This information is not intended to replace advice given to you by your health care provider. Make sure you discuss any questions you have with your health care provider.  Document Revised: 04/01/2019 Document Reviewed: 04/01/2019  [employer] Patient Education © 2021 [employer]    Exercising to Stay Healthy  To become healthy and stay healthy, it is recommended that you do moderate-intensity and vigorous-intensity exercise. You can tell that you are exercising at a moderate intensity if your heart starts beating faster and you start breathing faster but can still hold a conversation. You can tell that you are exercising at a vigorous intensity if you are breathing much harder and faster and cannot hold a conversation while exercising.  Exercising regularly is important. It has many health benefits, such as:  Improving overall fitness, flexibility, and endurance.  Increasing bone density.  Helping with weight control.  Decreasing body fat.  Increasing muscle strength.  Reducing stress and  tension.  Improving overall health.  How often should I exercise?  Choose an activity that you enjoy, and set realistic goals. Your health care provider can help you make an activity plan that works for you.  Exercise regularly as told by your health care provider. This may include:  Doing strength training two times a week, such as:  Lifting weights.  Using resistance bands.  Push-ups.  Sit-ups.  Yoga.  Doing a certain intensity of exercise for a given amount of time. Choose from these options:  A total of 150 minutes of moderate-intensity exercise every week.  A total of 75 minutes of vigorous-intensity exercise every week.  A mix of moderate-intensity and vigorous-intensity exercise every week.  Children, pregnant women, people who have not exercised regularly, people who are overweight, and older adults may need to talk with a health care provider about what activities are safe to do. If you have a medical condition, be sure to talk with your health care provider before you start a new exercise program.  What are some exercise ideas?    Moderate-intensity exercise ideas include:  Walking 1 mile (1.6 km) in about 15 minutes.  Biking.  Hiking.  Golfing.  Dancing.  Water aerobics.  Vigorous-intensity exercise ideas include:  Walking 4.5 miles (7.2 km) or more in about 1 hour.  Jogging or running 5 miles (8 km) in about 1 hour.  Biking 10 miles (16.1 km) or more in about 1 hour.  Lap swimming.  Roller-skating or in-line skating.  Cross-country skiing.  Vigorous competitive sports, such as football, basketball, and soccer.  Jumping rope.  Aerobic dancing.  What are some everyday activities that can help me to get exercise?  Yard work, such as:  Pushing a .  Raking and bagging leaves.  Washing your car.  Pushing a stroller.  Shoveling snow.  Gardening.  Washing windows or floors.  How can I be more active in my day-to-day activities?  Use stairs instead of an elevator.  Take a walk during your lunch  break.  If you drive, park your car farther away from your work or school.  If you take public transportation, get off one stop early and walk the rest of the way.  Stand up or walk around during all of your indoor phone calls.  Get up, stretch, and walk around every 30 minutes throughout the day.  Enjoy exercise with a friend. Support to continue exercising will help you keep a regular routine of activity.  What guidelines can I follow while exercising?  Before you start a new exercise program, talk with your health care provider.  Do not exercise so much that you hurt yourself, feel dizzy, or get very short of breath.  Wear comfortable clothes and wear shoes with good support.  Drink plenty of water while you exercise to prevent dehydration or heat stroke.  Work out until your breathing and your heartbeat get faster.  Where to find more information  [place] Department of Health and Human Services: www.hhs.gov  Centers for Disease Control and Prevention (CDC): www.cdc.gov  Summary  Exercising regularly is important. It will improve your overall fitness, flexibility, and endurance.  Regular exercise also will improve your overall health. It can help you control your weight, reduce stress, and improve your bone density.  Do not exercise so much that you hurt yourself, feel dizzy, or get very short of breath.  Before you start a new exercise program, talk with your health care provider.  This information is not intended to replace advice given to you by your health care provider. Make sure you discuss any questions you have with your health care provider.  Document Revised: 11/30/2018 Document Reviewed: 11/08/2018  [employer]vier Patient Education © 2021 [employer]

## 2024-11-11 ENCOUNTER — OFFICE VISIT (OUTPATIENT)
Dept: INTERNAL MEDICINE | Facility: CLINIC | Age: 44
End: 2024-11-11
Payer: COMMERCIAL

## 2024-11-11 VITALS
HEIGHT: 71 IN | SYSTOLIC BLOOD PRESSURE: 130 MMHG | RESPIRATION RATE: 18 BRPM | BODY MASS INDEX: 30.24 KG/M2 | DIASTOLIC BLOOD PRESSURE: 80 MMHG | WEIGHT: 216 LBS | TEMPERATURE: 97.8 F | HEART RATE: 78 BPM

## 2024-11-11 DIAGNOSIS — M72.2 PLANTAR FASCIITIS: ICD-10-CM

## 2024-11-11 DIAGNOSIS — R03.0 TRANSIENT ELEVATED BLOOD PRESSURE: Primary | ICD-10-CM

## 2024-11-11 DIAGNOSIS — J30.9 ALLERGIC RHINITIS, UNSPECIFIED SEASONALITY, UNSPECIFIED TRIGGER: ICD-10-CM

## 2024-11-11 PROCEDURE — 99214 OFFICE O/P EST MOD 30 MIN: CPT | Performed by: NURSE PRACTITIONER

## 2024-11-11 RX ORDER — MONTELUKAST SODIUM 10 MG/1
10 TABLET ORAL NIGHTLY
Qty: 90 TABLET | Refills: 1 | Status: SHIPPED | OUTPATIENT
Start: 2024-11-11

## 2024-11-11 RX ORDER — VALSARTAN 160 MG/1
160 TABLET ORAL DAILY
Qty: 90 TABLET | Refills: 1 | Status: SHIPPED | OUTPATIENT
Start: 2024-11-11

## 2024-11-18 NOTE — PROGRESS NOTES
Follow Up Office Visit      Patient Name: Julian Willoughby  : 1980   MRN: [id number]   Care Team: Patient Care Team:  Lucio Mesa MD as PCP - General (Internal Medicine)  Nilsa Martin APRN as Nurse Practitioner (Cardiology)  Kendrick Cazares MD as Cardiologist (Cardiology)  Kendrick Calderon MD as Consulting Physician (Colon and Rectal Surgery)    Chief Complaint:    Chief Complaint   Patient presents with    Foot Pain     Right foot  ( heel ) x2 months.        History of Present Illness: Julian Willoughby is a 44 y.o. male with pertinent medical history significant for hyperlipidemia, hypertension, atopic dermatitis, obesity and allergic rhinitis.    He presents today for new issue of right plantar/heel foot pain.  This is ongoing now for approximately 2 months.  He has tried applying ice therapy, supportive shoes but no relief of symptoms  He attributes the symptoms to a change in his walking pattern to work.  He works on [employer] and now has a much longer route, flowing through lexi, uneven pathway.  He describes the pain as intermittent, worse with first arising out of bed in the morning, or from periods of sitting.  The pain does not disrupt his sleep.  The pain is relieved with sitting and relieving pressure from the foot.  He has never had the symptoms in the past.  The pain is aching/throbbing sometimes sharp.    Hypertension-chronic and ongoing.  He continues to take valsartan 160 mg daily.  He is in need of refills today.    Allergic rhinitis-chronic and ongoing.  He continues to take montelukast 10 mg nightly for this condition.  He is inquiring about refills today.    Subjective          I have reviewed and the following portions of the patient's history were updated as appropriate: past family history, past medical history, past social history, past surgical history and problem list.    Medications:     Current Outpatient Medications:     famotidine (PEPCID) 20 MG tablet,  "Take 1 tablet by mouth Daily., Disp: 30 tablet, Rfl: 11    fluticasone (FLONASE) 50 MCG/ACT nasal spray, 1 spray into the nostril(s) as directed by provider Daily., Disp: 16 g, Rfl: 3    montelukast (SINGULAIR) 10 MG tablet, Take 1 tablet by mouth Every Night., Disp: 90 tablet, Rfl: 1    Multiple Vitamin (MULTI-VITAMIN DAILY PO), Take  by mouth As Needed., Disp: , Rfl:     olopatadine (PATADAY) 0.2 % solution ophthalmic solution, Administer 1 drop to both eyes Daily., Disp: 2.5 mL, Rfl: 3    Omega-3 Fatty Acids (fish oil) 1000 MG capsule capsule, Take 1 capsule by mouth Daily., Disp: , Rfl:     Red Yeast Rice Extract 600 MG capsule, Take 1,200 mg by mouth Daily., Disp: , Rfl:     valsartan (DIOVAN) 160 MG tablet, Take 1 tablet by mouth Daily., Disp: 90 tablet, Rfl: 1    Allergies:   Allergies   Allergen Reactions    Claritin [Loratadine] Other (See Comments)     Depressed      Sudafed [Pseudoephedrine Hcl] Other (See Comments)     depressed    Sulfa Antibiotics GI Intolerance    Tobramycin Unknown (See Comments)       Objective     Physical Exam:  Vital Signs:   Vitals:    11/11/24 1437   BP: 130/80   BP Location: Left arm   Patient Position: Sitting   Cuff Size: Adult   Pulse: 78   Resp: 18   Temp: 97.8 °F (36.6 °C)   TempSrc: Infrared   Weight: 98 kg (216 lb)   Height: 180 cm (70.87\")   PainSc:   2   PainLoc: Foot     Body mass index is 30.24 kg/m².     Physical Exam  Vitals and nursing note reviewed.   HENT:      Head: Normocephalic and atraumatic.   Eyes:      General: No scleral icterus.     Conjunctiva/sclera: Conjunctivae normal.   Cardiovascular:      Rate and Rhythm: Normal rate.   Pulmonary:      Effort: Pulmonary effort is normal. No respiratory distress.   Musculoskeletal:      Right lower leg: No edema.      Left lower leg: No edema.   Neurological:      Mental Status: He is alert. Mental status is at baseline.      Gait: Gait normal.   Psychiatric:         Mood and Affect: Mood normal.         Thought " Content: Thought content normal.         Assessment / Plan      Assessment/Plan:   Problems Addressed This Visit    ICD-10-CM ICD-9-CM   1. Transient elevated blood pressure  R03.0 796.2   2. Allergic rhinitis, unspecified seasonality, unspecified trigger  J30.9 477.9   3. Plantar fasciitis  M72.2 728.71      Transient elevated blood pressure  Hypertension  -Stable at this time  -Continue valsartan 160 mg daily, refill sent to pharmacy    Allergic rhinitis  -Continue montelukast 10 mg daily, refill sent to pharmacy    Plantar fasciitis, right  -Recommend OTC dosage of ibuprofen or application of Voltaren gel   -Recommend daily stretching/rolling exercises with tennis ball and/or frozen bottle of water  -Recommend significant support shoes or use of shoe inserts  -Avoid high-impact activities such as running until symptoms have resolved  -We have discussed trial of KT tape application and/or referral to PT if desired        Plan of care reviewed with patient at the conclusion of today's visit. Education was provided regarding diagnosis and management.  Patient verbalizes understanding of and agreement with management plan.    Follow Up:   Return if symptoms worsen or fail to improve, for Next scheduled follow up.        MICHAEL Jon  [facility] [address]    Please note that portions of this note were completed with a voice recognition program.

## 2025-01-13 ENCOUNTER — OFFICE VISIT (OUTPATIENT)
Dept: INTERNAL MEDICINE | Facility: CLINIC | Age: 45
End: 2025-01-13
Payer: COMMERCIAL

## 2025-01-13 VITALS
TEMPERATURE: 97.1 F | SYSTOLIC BLOOD PRESSURE: 128 MMHG | RESPIRATION RATE: 18 BRPM | BODY MASS INDEX: 30.74 KG/M2 | DIASTOLIC BLOOD PRESSURE: 80 MMHG | HEART RATE: 88 BPM | WEIGHT: 219.6 LBS

## 2025-01-13 DIAGNOSIS — H66.002 NON-RECURRENT ACUTE SUPPURATIVE OTITIS MEDIA OF LEFT EAR WITHOUT SPONTANEOUS RUPTURE OF TYMPANIC MEMBRANE: ICD-10-CM

## 2025-01-13 DIAGNOSIS — R05.1 ACUTE COUGH: ICD-10-CM

## 2025-01-13 DIAGNOSIS — J01.40 ACUTE NON-RECURRENT PANSINUSITIS: Primary | ICD-10-CM

## 2025-01-13 LAB
EXPIRATION DATE: NORMAL
FLUAV AG UPPER RESP QL IA.RAPID: NOT DETECTED
FLUBV AG UPPER RESP QL IA.RAPID: NOT DETECTED
INTERNAL CONTROL: NORMAL
Lab: NORMAL
SARS-COV-2 AG UPPER RESP QL IA.RAPID: NOT DETECTED

## 2025-01-13 PROCEDURE — 87428 SARSCOV & INF VIR A&B AG IA: CPT

## 2025-01-13 PROCEDURE — 99213 OFFICE O/P EST LOW 20 MIN: CPT

## 2025-01-13 RX ORDER — FLUTICASONE PROPIONATE 50 MCG
1 SPRAY, SUSPENSION (ML) NASAL DAILY
Qty: 16 G | Refills: 3 | Status: SHIPPED | OUTPATIENT
Start: 2025-01-13

## 2025-01-13 RX ORDER — DEXTROMETHORPHAN HYDROBROMIDE AND PROMETHAZINE HYDROCHLORIDE 15; 6.25 MG/5ML; MG/5ML
5 SYRUP ORAL NIGHTLY PRN
Qty: 240 ML | Refills: 0 | Status: SHIPPED | OUTPATIENT
Start: 2025-01-13

## 2025-01-13 RX ORDER — BENZONATATE 100 MG/1
100 CAPSULE ORAL 3 TIMES DAILY PRN
Qty: 30 CAPSULE | Refills: 0 | Status: SHIPPED | OUTPATIENT
Start: 2025-01-13

## 2025-01-13 RX ORDER — CEFDINIR 300 MG/1
300 CAPSULE ORAL 2 TIMES DAILY
Qty: 14 CAPSULE | Refills: 0 | Status: SHIPPED | OUTPATIENT
Start: 2025-01-13 | End: 2025-01-20

## 2025-01-13 NOTE — PATIENT INSTRUCTIONS
It is important to treat your symptoms aggressively; this includes antihistamine (claritin, zyrtec, or allegra) for runny nose, flonase and nasal saline rinses for nasal congestion, mucinex for cough and chest congestion, tylenol as needed for fever or headaches, lots of rest, and lots of water.

## 2025-01-13 NOTE — PROGRESS NOTES
Chief Complaint  Cough (Sinus pain, congestion, no fever x 1 week/Sore throat)    Subjective          Julian Willoughby presents to [facility]  Cough  Associated symptoms include postnasal drip and rhinorrhea. Pertinent negatives include no chills, fever or sore throat.     Patient presents to the office today with concerns for a cough, congestion and sinus pain for a week.  He did initially have a sore throat but this has since resolved. Has a history of sinusitis.  He states he typically has had sinusitis in the winter.  He has been utilizing Flonase and saline nasal sprays.  Denies any chest pain or shortness of breath.  Denies any fever or chills.        Objective   Vital Signs:   /80 (BP Location: Right arm, Patient Position: Sitting, Cuff Size: Adult)   Pulse 88   Temp 97.1 °F (36.2 °C) (Temporal)   Resp 18   Wt 99.6 kg (219 lb 9.6 oz)   BMI 30.74 kg/m²     Body mass index is 30.74 kg/m².    Review of Systems   Constitutional:  Negative for chills and fever.   HENT:  Positive for congestion, postnasal drip, rhinorrhea and sinus pressure. Negative for sore throat.    Respiratory:  Positive for cough.    Neurological:  Positive for headache.       Past History:  Medical History: has a past medical history of Allergic (Mold and cats), Bronchitis, Chicken pox, External hemorrhoid, GERD (gastroesophageal reflux disease), Hiatal hernia, Hyperlipidemia, Hypertension, and Internal hemorrhoid.   Surgical History: has a past surgical history that includes Eye surgery.   Family History: family history includes Cancer in his mother; Colon cancer (age of onset: 67) in his mother; High cholesterol in his father; Hypertension in his father and another family member; No Known Problems in his brother.   Social History: reports that he has never smoked. He has never been exposed to tobacco smoke. He has never used smokeless tobacco. He reports that he does not  drink alcohol and does not use drugs.      Current Outpatient Medications:     famotidine (PEPCID) 20 MG tablet, Take 1 tablet by mouth Daily., Disp: 30 tablet, Rfl: 11    fluticasone (FLONASE) 50 MCG/ACT nasal spray, Administer 1 spray into the nostril(s) as directed by provider Daily., Disp: 16 g, Rfl: 3    montelukast (SINGULAIR) 10 MG tablet, Take 1 tablet by mouth Every Night., Disp: 90 tablet, Rfl: 1    Multiple Vitamin (MULTI-VITAMIN DAILY PO), Take  by mouth As Needed., Disp: , Rfl:     olopatadine (PATADAY) 0.2 % solution ophthalmic solution, Administer 1 drop to both eyes Daily., Disp: 2.5 mL, Rfl: 3    Omega-3 Fatty Acids (fish oil) 1000 MG capsule capsule, Take 1 capsule by mouth Daily., Disp: , Rfl:     Red Yeast Rice Extract 600 MG capsule, Take 1,200 mg by mouth Daily., Disp: , Rfl:     valsartan (DIOVAN) 160 MG tablet, Take 1 tablet by mouth Daily., Disp: 90 tablet, Rfl: 1    benzonatate (Tessalon Perles) 100 MG capsule, Take 1 capsule by mouth 3 (Three) Times a Day As Needed for Cough., Disp: 30 capsule, Rfl: 0    cefdinir (OMNICEF) 300 MG capsule, Take 1 capsule by mouth 2 (Two) Times a Day for 7 days., Disp: 14 capsule, Rfl: 0    promethazine-dextromethorphan (PROMETHAZINE-DM) 6.25-15 MG/5ML syrup, Take 5 mL by mouth At Night As Needed for Cough., Disp: 240 mL, Rfl: 0    Allergies: Claritin [loratadine], Sudafed [pseudoephedrine hcl], Sulfa antibiotics, and Tobramycin    Physical Exam  Vitals and nursing note reviewed.   Constitutional:       General: He is not in acute distress.     Appearance: He is not ill-appearing or toxic-appearing.   HENT:      Head: Normocephalic and atraumatic.      Right Ear: Ear canal and external ear normal.      Left Ear: Ear canal and external ear normal.      Nose: Congestion and rhinorrhea present.      Right Sinus: Maxillary sinus tenderness and frontal sinus tenderness present.      Left Sinus: Maxillary sinus tenderness and frontal sinus tenderness present.       Mouth/Throat:      Pharynx: Posterior oropharyngeal erythema (cobblestoning) and postnasal drip present.   Eyes:      General: No scleral icterus.  Cardiovascular:      Rate and Rhythm: Normal rate and regular rhythm.   Pulmonary:      Effort: Pulmonary effort is normal.      Breath sounds: Normal breath sounds.   Neurological:      Mental Status: He is alert.        Result Review :               Latest Reference Range & Units 01/13/25 15:32   SARS Antigen Not Detected, Presumptive Negative  Not Detected   Expiration Date  10/18/2025   Lot Number  [id number]   Influenza A Antigen GEORGE Not Detected  Not Detected   Influenza B Antigen GEORGE Not Detected  Not Detected          Assessment and Plan    Assessment & Plan  Acute non-recurrent pansinusitis  Point-of-care testing in office today negative for COVID and influenza.  Discussed that his symptom profile and physical exam findings appear consistent with pansinusitis.  Will treat with cefdinir 300 mg twice daily for a week.  Recommend taking this with food to prevent GI upset.  Recommend continued use of Flonase nasal spray.  We discussed proper administration.  Provided a refill for this today.  Recommend use of over-the-counter oral antihistamine such as Claritin, Allegra, or Zyrtec.  Recommend he could also utilize Mucinex for his symptoms.  Orders:    cefdinir (OMNICEF) 300 MG capsule; Take 1 capsule by mouth 2 (Two) Times a Day for 7 days.    fluticasone (FLONASE) 50 MCG/ACT nasal spray; Administer 1 spray into the nostril(s) as directed by provider Daily.    Non-recurrent acute suppurative otitis media of left ear without spontaneous rupture of tympanic membrane  There is evidence of acute suppurative otitis media of his left ear on exam.  Discussed that cefdinir 300 mg twice daily for a week will also help with this.  Recommend use of Flonase nasal spray as well as over-the-counter oral antihistamine such as Claritin, Allegra or Zyrtec.  Recommend avoiding use of  in-ear headphones.  Recommend that he maintain proper ear protection from the cold temperatures.  Recommend he avoid loud noise exposures.       Acute cough  Point-of-care COVID and influenza negative in office today.  Will provide prescriptions for both Promethazine DM cough syrup as well as Tessalon Perles 100 mg 3 times daily as needed for cough.  He has tolerated both well in the past.  Discussed that Promethazine DM can cause drowsiness and recommendation is to take this at night.  Orders:    POCT SARS-CoV-2 Antigen GEORGE + Flu    promethazine-dextromethorphan (PROMETHAZINE-DM) 6.25-15 MG/5ML syrup; Take 5 mL by mouth At Night As Needed for Cough.    benzonatate (Tessalon Perles) 100 MG capsule; Take 1 capsule by mouth 3 (Three) Times a Day As Needed for Cough.    Recommend if symptoms worsen or fail to improve, he return to clinic for further evaluation.  Patient is agreeable to this and verbalized understanding of plan of care.    Follow Up   Return if symptoms worsen or fail to improve, for Next scheduled follow up.  Patient was given instructions and counseling regarding his condition or for health maintenance advice. Please see specific information pulled into the AVS if appropriate.     MICHAEL Singh

## 2025-04-24 DIAGNOSIS — K21.9 GASTROESOPHAGEAL REFLUX DISEASE, UNSPECIFIED WHETHER ESOPHAGITIS PRESENT: ICD-10-CM

## 2025-04-24 RX ORDER — FAMOTIDINE 20 MG/1
20 TABLET, FILM COATED ORAL DAILY
Qty: 30 TABLET | Refills: 11 | Status: SHIPPED | OUTPATIENT
Start: 2025-04-24

## 2025-04-24 NOTE — TELEPHONE ENCOUNTER
"Caller: Julian Willoughby \"Pacheco\"    Relationship: Self    Best call back number: [phone]    What was the call regarding: PATIENT IS IN THE PROCESS OF SWITCHING TO A DIFFERENT PROVIDER SO HE DOES NOT NEED THIS MEDICATION     "

## 2025-04-24 NOTE — TELEPHONE ENCOUNTER
LOV 1/13/25    NOV     Patient was to follow up and return in about 6 months (around 4/4/2025). Please schedule appointment before refills can be sent.

## 2025-06-04 ENCOUNTER — OFFICE VISIT (OUTPATIENT)
Dept: CARDIOLOGY | Facility: CLINIC | Age: 45
End: 2025-06-04
Payer: COMMERCIAL

## 2025-06-04 VITALS
HEIGHT: 71 IN | HEART RATE: 100 BPM | BODY MASS INDEX: 29.68 KG/M2 | OXYGEN SATURATION: 93 % | SYSTOLIC BLOOD PRESSURE: 128 MMHG | DIASTOLIC BLOOD PRESSURE: 82 MMHG | WEIGHT: 212 LBS

## 2025-06-04 DIAGNOSIS — E78.2 MIXED HYPERLIPIDEMIA: Primary | ICD-10-CM

## 2025-06-04 PROCEDURE — 99214 OFFICE O/P EST MOD 30 MIN: CPT | Performed by: INTERNAL MEDICINE

## 2025-06-04 RX ORDER — AMLODIPINE AND VALSARTAN 5; 160 MG/1; MG/1
1 TABLET ORAL DAILY
Qty: 90 TABLET | Refills: 3 | Status: SHIPPED | OUTPATIENT
Start: 2025-06-04 | End: 2026-06-04

## 2025-06-04 NOTE — PROGRESS NOTES
Subjective:     Encounter Date: 06/04/2025    Patient ID: Julian Willoughby is a 44 y.o. 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.     REMOTE PHYSICIAN: Maribel Syed MD  INTERNIST: Lucio Mesa MD  ALLERGIST: Lala Jeong MD    Chief Complaint:   Chief Complaint   Patient presents with    Mixed hyperlipidemia     Problem List:  Elevated labile blood pressure readings / hypertension  Residual class I symptoms and normal EKG, October 2020   abnormal 24-hour ambulatory blood pressure monitor and acceptable serum catecholamine results as well as aldosterone / PRA ratio result with normal nocturnal oximetry screening study, autumn 2020  Hyperlipidemia  Severe hepatic steatosis seen on CT July 2023  Allergic rhinitis; gets routine allergy shots  Obesity, resolved - overweight, Body mass index is 29.57 kg/m².   Surgical history:  Right tear duct repair  [place] teeth extraction    Allergies   Allergen Reactions    Claritin [Loratadine] Other (See Comments)     Depressed      Sudafed [Pseudoephedrine Hcl] Other (See Comments)     depressed    Sulfa Antibiotics GI Intolerance    Tobramycin Unknown (See Comments)       Current Outpatient Medications:     famotidine (PEPCID) 20 MG tablet, TAKE 1 TABLET BY MOUTH DAILY, Disp: 30 tablet, Rfl: 11    fluticasone (FLONASE) 50 MCG/ACT nasal spray, Administer 1 spray into the nostril(s) as directed by provider Daily., Disp: 16 g, Rfl: 3    montelukast (SINGULAIR) 10 MG tablet, Take 1 tablet by mouth Every Night., Disp: 90 tablet, Rfl: 1    Multiple Vitamin (MULTI-VITAMIN DAILY PO), Take  by mouth As Needed., Disp: , Rfl:     Omega-3 Fatty Acids (fish oil) 1000 MG capsule capsule, Take 1 capsule by mouth Daily., Disp: , Rfl:     Red Yeast Rice Extract 600 MG capsule,  "Take 1,200 mg by mouth Daily., Disp: , Rfl:     valsartan (DIOVAN) 160 MG tablet, Take 1 tablet by mouth Daily., Disp: 90 tablet, Rfl: 1    benzonatate (Tessalon Perles) 100 MG capsule, Take 1 capsule by mouth 3 (Three) Times a Day As Needed for Cough., Disp: 30 capsule, Rfl: 0    olopatadine (PATADAY) 0.2 % solution ophthalmic solution, Administer 1 drop to both eyes Daily., Disp: 2.5 mL, Rfl: 3    promethazine-dextromethorphan (PROMETHAZINE-DM) 6.25-15 MG/5ML syrup, Take 5 mL by mouth At Night As Needed for Cough., Disp: 240 mL, Rfl: 0     History of Present Illness: Julian Willoughby returns to today for routine follow-up of hypertension and hyperlipidemia.  He is a former patient of Dr. Foster after initially being referred for elevated blood pressure.     Since his last visit with me in May of last year he has no new cardiovascular complaints with no interval development of chest pain or dyspnea.  He has had a few outpatient visits with elevated systolic blood pressure readings but has not had any interval medication changes.    Review of Systems   Constitutional: Negative for decreased appetite.   Cardiovascular:  Negative for chest pain, dyspnea on exertion, leg swelling and palpitations.        Procedures       Objective:       Vitals:    06/04/25 1555   BP: 128/82   BP Location: Left arm   Patient Position: Sitting   Pulse: 100   SpO2: 93%   Weight: 96.2 kg (212 lb)   Height: 180.3 cm (71\")   Body mass index is 29.57 kg/m².   Gen: well developed, sitting up on exam table comfortable appearing  HEENT: MMM, sclera anicteric, conjunctiva normal, no carotid bruits  CV: regular rate (80s on auscultation), regular rhythm, no murmurs or rubs, normal S1, S2. 2+ radial and DP pulses  Pulm: RA, normal work of breathing, no wheezes, rales, rhonchi  Ext: normal bulk for age, normal tone, no dependent edema  Neuro: alert, oriented, face symmetrical, moving all extremities well  Psych: normal mood, appropriate affect "     Lab Review:     Lab Results   Component Value Date    GLUCOSE 75 10/04/2024    BUN 14 10/04/2024    CREATININE 0.99 10/04/2024    BCR 14.1 10/04/2024     10/04/2024    K 4.5 10/04/2024     10/04/2024    CO2 24.0 10/04/2024    CALCIUM 9.7 10/04/2024    ALBUMIN 4.5 10/04/2024    AST 33 10/04/2024    ALT 60 (H) 10/04/2024     Lab Results   Component Value Date    WBC 5.82 10/04/2024    HGB 17.6 10/04/2024    HCT 51.8 (H) 10/04/2024    MCV 89.0 10/04/2024     10/04/2024     Lab Results   Component Value Date    CHOL 161 10/04/2024    TRIG 158 (H) 10/04/2024    HDL 39 (L) 10/04/2024    LDL 94 10/04/2024         Assessment:       Overall continued acceptable course with no new interim cardiac complaints with good functional status. We will defer additional diagnostic or therapeutic intervention from a cardiac perspective at this time.    Diagnosis Plan   Hypertension  controlled today but with some elevated readings will resume amlodipine with some elevated readings since our last visit     Dyslipidemia    The 10-year ASCVD risk score (Margarita MEDEL, et al., 2019) is: 2.2%    Values used to calculate the score:      Age: 44 years      Sex: Male      Is Non- : No      Diabetic: No      Tobacco smoker: No      Systolic Blood Pressure: 128 mmHg      Is BP treated: Yes      HDL Cholesterol: 39 mg/dL      Total Cholesterol: 161 mg/dL    Continue current treatment, including diet, exercise.     Will order Lp(a) again for additional risk stratification       Hepatosteatosis Continued focus on diet and weight loss          Plan:       No statin therapy at this time, if Lp(a) is elevated will consider statin for primary prevention  Return in about 1 year (around 6/4/2026).     AMY Cazares MD  06/04/25